# Patient Record
Sex: FEMALE | Race: WHITE | NOT HISPANIC OR LATINO | Employment: FULL TIME | URBAN - NONMETROPOLITAN AREA
[De-identification: names, ages, dates, MRNs, and addresses within clinical notes are randomized per-mention and may not be internally consistent; named-entity substitution may affect disease eponyms.]

---

## 2019-02-10 ENCOUNTER — HOSPITAL ENCOUNTER (EMERGENCY)
Facility: HOSPITAL | Age: 59
Discharge: HOME | End: 2019-02-10
Attending: NURSE PRACTITIONER | Admitting: NURSE PRACTITIONER
Payer: COMMERCIAL

## 2019-02-10 VITALS
OXYGEN SATURATION: 97 % | DIASTOLIC BLOOD PRESSURE: 81 MMHG | RESPIRATION RATE: 16 BRPM | SYSTOLIC BLOOD PRESSURE: 122 MMHG | TEMPERATURE: 97.8 F

## 2019-02-10 DIAGNOSIS — N76.0 VULVOVAGINITIS: ICD-10-CM

## 2019-02-10 LAB
ALBUMIN UR-MCNC: NEGATIVE MG/DL
APPEARANCE UR: CLEAR
BACTERIA #/AREA URNS HPF: ABNORMAL /HPF
BILIRUB UR QL STRIP: NEGATIVE
COLOR UR AUTO: ABNORMAL
GLUCOSE UR STRIP-MCNC: NEGATIVE MG/DL
HGB UR QL STRIP: NEGATIVE
KETONES UR STRIP-MCNC: NEGATIVE MG/DL
LEUKOCYTE ESTERASE UR QL STRIP: NEGATIVE
NITRATE UR QL: NEGATIVE
PH UR STRIP: 6.5 PH (ref 4.7–8)
RBC #/AREA URNS AUTO: 1 /HPF (ref 0–2)
SOURCE: ABNORMAL
SP GR UR STRIP: 1.02 (ref 1–1.03)
SPECIMEN SOURCE: NORMAL
UROBILINOGEN UR STRIP-MCNC: NORMAL MG/DL (ref 0–2)
WBC #/AREA URNS AUTO: 1 /HPF (ref 0–5)
WET PREP SPEC: NORMAL

## 2019-02-10 PROCEDURE — 87210 SMEAR WET MOUNT SALINE/INK: CPT | Performed by: NURSE PRACTITIONER

## 2019-02-10 PROCEDURE — 99213 OFFICE O/P EST LOW 20 MIN: CPT | Mod: Z6 | Performed by: NURSE PRACTITIONER

## 2019-02-10 PROCEDURE — G0463 HOSPITAL OUTPT CLINIC VISIT: HCPCS

## 2019-02-10 PROCEDURE — 81001 URINALYSIS AUTO W/SCOPE: CPT | Performed by: NURSE PRACTITIONER

## 2019-02-10 NOTE — ED PROVIDER NOTES
"  History     Chief Complaint   Patient presents with     Vaginitis     The history is provided by the patient. No  was used.     Rita Stovall is a 58 year old female who has had vaginal burning without discharge for about the past week. Denies dysuria. She is post-menopausal. She normally showers, but has been taking baths with Calgon in the bath. She has recently started using the tanning smith and is concerned she may have picked something up, even though she cleans the tanning bed. Some dyspareunia, describes it as \"a rubbing pain.\" She is monogamous.    Allergies:  No Known Allergies    Problem List:    There are no active problems to display for this patient.       Past Medical History:    No past medical history on file.    Past Surgical History:    Past Surgical History:   Procedure Laterality Date     COLONOSCOPY  7/26/2013    Procedure: COLONOSCOPY;  COLONOSCOPY WITH Polypectomy;  Surgeon: Sergei Andrade MD;  Location: [facility]     ENT SURGERY         Family History:    No family history on file.    Social History:  Marital Status:   [2]  Social History     Tobacco Use     Smoking status: Never Smoker   Substance Use Topics     Alcohol use: Yes     Comment: social     Drug use: Not on file        Medications:      VITAMIN D, CHOLECALCIFEROL, PO         Review of Systems   Constitutional: Negative for fever.   Respiratory: Negative for shortness of breath.    Cardiovascular: Negative for chest pain.   Gastrointestinal: Negative for abdominal pain.   Genitourinary: Positive for dyspareunia and vaginal pain. Negative for decreased urine volume, difficulty urinating, dysuria, flank pain, frequency, genital sores, pelvic pain, urgency, vaginal bleeding and vaginal discharge.   All other systems reviewed and are negative.      Physical Exam   BP: 122/81  Heart Rate: 64  Temp: 97.8  F (36.6  C)  Resp: 16  SpO2: 97 %      Physical Exam   Constitutional: She is oriented to person, " place, and time. She appears well-developed and well-nourished. No distress.   HENT:   Head: Normocephalic and atraumatic.   Eyes: No scleral icterus.   Neck: Normal range of motion. Neck supple.   Genitourinary: Pelvic exam was performed with patient supine. There is no rash, tenderness or lesion on the right labia. There is no rash, tenderness or lesion on the left labia.   Genitourinary Comments: Mild erythema perivaginally. No discharge.   Neurological: She is alert and oriented to person, place, and time.   Skin: Skin is warm and dry. No rash noted. She is not diaphoretic. No erythema. No pallor.       ED Course        Procedures    Results for orders placed or performed during the hospital encounter of 02/10/19 (from the past 24 hour(s))   Wet prep   Result Value Ref Range    Specimen Description Vagina     Wet Prep Moderate  WBC'S seen       Wet Prep No Trichomonas seen     Wet Prep No yeast seen     Wet Prep No clue cells seen    UA with Microscopic reflex to Culture   Result Value Ref Range    Color Urine Light Yellow     Appearance Urine Clear     Glucose Urine Negative NEG^Negative mg/dL    Bilirubin Urine Negative NEG^Negative    Ketones Urine Negative NEG^Negative mg/dL    Specific Gravity Urine 1.018 1.003 - 1.035    Blood Urine Negative NEG^Negative    pH Urine 6.5 4.7 - 8.0 pH    Protein Albumin Urine Negative NEG^Negative mg/dL    Urobilinogen mg/dL Normal 0.0 - 2.0 mg/dL    Nitrite Urine Negative NEG^Negative    Leukocyte Esterase Urine Negative NEG^Negative    Source Midstream Urine     WBC Urine 1 0 - 5 /HPF    RBC Urine 1 0 - 2 /HPF    Bacteria Urine None (A) NEG^Negative /HPF       Medications - No data to display    Assessments & Plan (with Medical Decision Making)     I have reviewed the nursing notes.    I have reviewed the findings, diagnosis, plan and need for follow up with the patient.   WBC's noted on wet prep, but other labs are normal. No drainage or odor. Burning sensation most  likely due to Calgon baths. Switch to plain-water baths. Follow up with Dr. Aguiar if symptoms are not improving within 1 week. Return to urgent care or ER if symptoms are worsening. Rita is agreeable to plan and discharged to home in stable condition.         Medication List      There are no discharge medications for this visit.         Final diagnoses:   Vulvovaginitis       2/10/2019   [facility]     Maria G Montanez APRN CNP  02/11/19 1006

## 2019-02-10 NOTE — ED AVS SNAPSHOT
[facility]  [address] [place] [id number]  Phone:  [phone]                                    Rita Stovall   MRN: [id number]    Department:  [facility]   Date of Visit:  2/10/2019           After Visit Summary Signature Page    I have received my discharge instructions, and my questions have been answered. I have discussed any challenges I see with this plan with the nurse or doctor.    ..........................................................................................................................................  Patient/Patient Representative Signature      ..........................................................................................................................................  Patient Representative Print Name and Relationship to Patient    ..................................................               ................................................  Date                                   Time    ..........................................................................................................................................  Reviewed by Signature/Title    ...................................................              ..............................................  Date                                               Time          22EPIC Rev 08/18

## 2019-02-10 NOTE — ED TRIAGE NOTES
Patient presents with 1 week or more of vaginal irritation.  States she thought it would get better but it has not.

## 2019-02-11 ASSESSMENT — ENCOUNTER SYMPTOMS
DYSURIA: 0
FEVER: 0
FREQUENCY: 0
DIFFICULTY URINATING: 0
ABDOMINAL PAIN: 0
FLANK PAIN: 0
SHORTNESS OF BREATH: 0

## 2020-07-08 ENCOUNTER — TRANSFERRED RECORDS (OUTPATIENT)
Dept: HEALTH INFORMATION MANAGEMENT | Facility: HOSPITAL | Age: 60
End: 2020-07-08

## 2020-07-08 ENCOUNTER — TRANSFERRED RECORDS (OUTPATIENT)
Dept: HEALTH INFORMATION MANAGEMENT | Facility: CLINIC | Age: 60
End: 2020-07-08

## 2020-07-08 LAB
CHOLEST SERPL-MCNC: 226 MG/DL (ref 114–200)
CREAT SERPL-MCNC: 0.85 MG/DL (ref 0.4–1)
GFR SERPL CREATININE-BSD FRML MDRD: >60 ML/MIN/1.73M2
GLUCOSE SERPL-MCNC: 97 MG/DL (ref 70–99)
HDLC SERPL-MCNC: 57 MG/DL (ref 40–60)
HPV ABSTRACT: NORMAL
LDLC SERPL CALC-MCNC: 152 MG/DL
PAP-ABSTRACT: NORMAL
POTASSIUM SERPL-SCNC: 3.7 MEQ/L (ref 3.4–5.1)
TRIGL SERPL-MCNC: 86 MG/DL (ref 10–200)
TSH SERPL-ACNC: 3.05 UIU/ML (ref 0.4–3.99)

## 2020-10-19 ENCOUNTER — OFFICE VISIT (OUTPATIENT)
Dept: OTOLARYNGOLOGY | Facility: OTHER | Age: 60
End: 2020-10-19
Attending: NURSE PRACTITIONER
Payer: COMMERCIAL

## 2020-10-19 VITALS
OXYGEN SATURATION: 98 % | SYSTOLIC BLOOD PRESSURE: 100 MMHG | HEIGHT: 63 IN | DIASTOLIC BLOOD PRESSURE: 70 MMHG | HEART RATE: 60 BPM | TEMPERATURE: 97 F | WEIGHT: 150 LBS | BODY MASS INDEX: 26.58 KG/M2

## 2020-10-19 DIAGNOSIS — R09.82 POST-NASAL DRAINAGE: Primary | ICD-10-CM

## 2020-10-19 PROCEDURE — 99213 OFFICE O/P EST LOW 20 MIN: CPT | Mod: 25 | Performed by: NURSE PRACTITIONER

## 2020-10-19 PROCEDURE — 31575 DIAGNOSTIC LARYNGOSCOPY: CPT | Performed by: NURSE PRACTITIONER

## 2020-10-19 RX ORDER — AZELASTINE 1 MG/ML
2 SPRAY, METERED NASAL 2 TIMES DAILY
Qty: 1 BOTTLE | Refills: 12 | Status: SHIPPED | OUTPATIENT
Start: 2020-10-19 | End: 2021-02-24

## 2020-10-19 RX ORDER — BUDESONIDE 0.5 MG/2ML
INHALANT ORAL
Qty: 1 BOX | Refills: 4 | Status: SHIPPED | OUTPATIENT
Start: 2020-10-19 | End: 2021-02-24

## 2020-10-19 ASSESSMENT — MIFFLIN-ST. JEOR: SCORE: 1229.53

## 2020-10-19 ASSESSMENT — PAIN SCALES - GENERAL: PAINLEVEL: NO PAIN (0)

## 2020-10-19 NOTE — PROGRESS NOTES
Otolaryngology Note         Chief Complaint:     Patient presents with:  Consult For: Recalcitrant post nasal drip           History of Present Illness:     She reports she started with PND a few months ago, she was seen by PCP and was started on Flonase without improvement, in fact she has noted worsening of symptoms.  She notes that her smell is blunted at times and her taste is also blunted at times.  She states the smell is worse when she feels more congested.      She reports constant feeling of globus sensation/drainage.  She coughs and clears sputum but this does not clear the globus sensation.  She has attempted to gargle with salt water, she is not using any other interventions.      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  She started he job about 3 months ago when symptoms were starting to worsen.      Appetite is decreased, she has been cutting carbs and attempting to lose weight.   No fatigue.  She does not endorse any shortness of breath or chest pain.   Symptoms have been present for 3-4 months and are worsening.      + history of sinus injury/trauma/surgery, Dr Pelayo from [place], about 15 + years, FESS due to recurrent sinusitis, has been doing well since surgery  No acid reflux   No dysphagia  No history of strep/ tonsillitis.   No history of COM, otological surgery  No history of rash  No history of seasonal allergies  She has a history of previous allergy testing with Dr Valderrama in [place] 30+ years ago, no allergies that she can remember.   No asthma  NO family history      Resides in house with no basement. There is no water or mold.  There is carpet in the bedroom.    Heat source in home: forced air  Pets: None         Medications:     Current Outpatient Rx   Medication Sig Dispense Refill     azelastine (ASTELIN) 0.1 % nasal spray Spray 2 sprays into both nostrils 2 times daily 1 Bottle 12     budesonide (PULMICORT) 0.5  "MG/2ML neb solution Mix 240 ml albino med sinus rinse, add 0.5 mg budesonide vial, rinse 1/2 bottle in each nostril twice daily 1 Box 4     VITAMIN D, CHOLECALCIFEROL, PO Take by mouth daily              Allergies:     Allergies: Patient has no known allergies.          Past Medical History:     No past medical history on file.         Past Surgical History:     Past Surgical History:   Procedure Laterality Date     COLONOSCOPY  7/26/2013    Procedure: COLONOSCOPY;  COLONOSCOPY WITH Polypectomy;  Surgeon: Sergei Andrade MD;  Location: [facility]     ENT SURGERY              Social History:     Social History     Tobacco Use     Smoking status: Never Smoker     Smokeless tobacco: Never Used   Substance Use Topics     Alcohol use: Yes     Comment: social     Drug use: None            Review of Systems:     ROS: See HPI         Physical Exam:     /70 (BP Location: Left arm, Patient Position: Sitting, Cuff Size: Adult Regular)   Pulse 60   Temp 97  F (36.1  C) (Tympanic)   Ht 1.6 m (5' 3\")   Wt 68 kg (150 lb)   SpO2 98%   BMI 26.57 kg/m      General - The patient is well nourished and well developed, and appears to have good nutritional status.  Alert and oriented to person and place, answers questions and cooperates with examination appropriately.   Head and Face - Normocephalic and atraumatic, with no gross asymmetry noted.  The facial nerve is intact, with strong symmetric movements.  Voice and Breathing - The patient was breathing comfortably without the use of accessory muscles. There was no wheezing, stridor, or stertor.  The patients voice was clear and strong, and had appropriate pitch and quality.  Ears -The external auditory canals are patent, the tympanic membranes are intact without effusion, retraction or mass.  Bony landmarks are intact.  Eyes - Extraocular movements intact, sclera were not icteric or injected, conjunctiva were pink and moist.  Mouth - Examination of the oral cavity showed pink, " healthy oral mucosa. No lesions or ulcerations noted.  The tongue was mobile and midline, and the dentition were in good condition.    Throat - The walls of the oropharynx were smooth, pink, moist, symmetric, and had no lesions or ulcerations.  The tonsillar pillars and soft palate were symmetric.  The uvula was midline on elevation.    Neck - Normal midline excursion of the laryngotracheal complex during swallowing.  Full range of motion on passive movement.  Palpation of the occipital, submental, submandibular, internal jugular chain, and supraclavicular nodes did not demonstrate any abnormal lymph nodes or masses.  Palpation of the thyroid was soft and smooth, with no nodules or goiter appreciated.  The trachea was mobile and midline.  Nose - External contour is symmetric, no gross deflection or scars.  Nasal mucosa is pink and moist with no abnormal mucus.  The septum was intact and the turbinates are enlarged.  No polyps, masses, or purulence noted on examination.    Attempts at mirror laryngoscopy were not possible due to gag reflex.  Therefore I proceeded with a fiberoptic examination after informed consent.  First I sprayed both sides of the nose with a mixture of lidocaine and neosynephrine.  I then passed the scope through the nasal cavity.     Findings:  Slight right septal deviation, no maeve polypoid change or purulence.     The nasopharynx was mucosally covered and symmetric, there was a large amount of clear drainage in the NP and base of the tongue.  The eustachian tube openings were unobstructed.  Going further down I had a clear view of the base of tongue which had normal appearing lingual tonsillar tissue.  The base of tongue was free of lesions, and the vallecula was open.  The epiglottis was smooth and mucosally covered.  The supraglottic larynx was then clearly visualized.  The vocal cords moved smoothly and symmetrically and were pearly white.  The pyriform sinuses were open and without maeve  mass or pooling of secretions upon valsalva, and the limited view of the postcricoid region did not show any lesions.  The patient tolerated the procedure well.         Assessment and Plan:       ICD-10-CM    1. Post-nasal drainage  R09.82 azelastine (ASTELIN) 0.1 % nasal spray     budesonide (PULMICORT) 0.5 MG/2ML neb solution       Budesonide rinses:  Budesonide nasal saline irrigation per instructions:  -Obtain Emir Med Sinus rinse over the counter.    -Use warm distilled water and 2 packets of the salt solution that comes with the bottle, dissolve in bottle up to the 240 mL gabriella.  -Add 1 vial of budesonide.  -Irrigate each side of your nose leaning over the sink, using 1/3 to 1/2 the volume of the bottle in each nostril every irrigation.  Irrigate 2 times daily.  -If additional rinses are needed/recommended, you may use the plan Emir Med Sinus irrigation without the use of added budesonide.     Start daily antihistamine - over the counter - Zyrtec (cetirizine), Claritin (loratadine), Allegra (fexofenadine)     Start astelin nasal spray 2 sprays twice daily    Follow up in 3-4 weeks for recheck        Nelly BAXTER  [facility] ENT

## 2020-10-19 NOTE — NURSING NOTE
"Chief Complaint   Patient presents with     Consult For     Recalcitrant post nasal drip       Initial /70 (BP Location: Left arm, Patient Position: Sitting, Cuff Size: Adult Regular)   Pulse 60   Temp 97  F (36.1  C) (Tympanic)   Ht 1.6 m (5' 3\")   Wt 68 kg (150 lb)   SpO2 98%   BMI 26.57 kg/m   Estimated body mass index is 26.57 kg/m  as calculated from the following:    Height as of this encounter: 1.6 m (5' 3\").    Weight as of this encounter: 68 kg (150 lb).  Medication Reconciliation: complete  Karey Mcgarry MA    "

## 2020-10-19 NOTE — PATIENT INSTRUCTIONS
Start budesonide nasal spray  Budesonide rinses:  Budesonide nasal saline irrigation per instructions:  -Obtain Emir Med Sinus rinse over the counter.    -Use warm distilled water and 2 packets of the salt solution that comes with the bottle, dissolve in bottle up to the 240 mL gabriella.  -Add 1 vial of budesonide.  -Irrigate each side of your nose leaning over the sink, using 1/3 to 1/2 the volume of the bottle in each nostril every irrigation.  Irrigate 2 times daily.  -If additional rinses are needed/recommended, you may use the plan Emir Med Sinus irrigation without the use of added budesonide.     Start daily antihistamine - over the counter - Zyrtec (cetirizine), Claritin (loratadine), Allegra (fexofenadine)     Start astelin nasal spray 2 sprays twice daily    Follow up in 3-4 weeks for recheck      Thank you for allowing Nelly BAXTER and our ENT team to participate in your care.  If your medications are too expensive, please call my nurse at the number listed below.  We can possibly change this medication.    If you have a scheduling or an appointment question please contact our Health Unit Coordinator at their direct line [phone].   ALL nursing questions or concerns can be directed to your ENT nurses at: [phone] or [phone].  ;

## 2020-10-19 NOTE — LETTER
10/19/2020         RE: Rita Stovall  [address] [address]  [place] [address]        Dear Colleague,    Thank you for referring your patient, Rita Stovall, to the [facility] [place]. Please see a copy of my visit note below.    Otolaryngology Note         Chief Complaint:     Patient presents with:  Consult For: Recalcitrant post nasal drip           History of Present Illness:     She reports she started with PND a few months ago, she was seen by PCP and was started on Flonase without improvement, in fact she has noted worsening of symptoms.  She notes that her smell is blunted at times and her taste is also blunted at times.  She states the smell is worse when she feels more congested.      She reports constant feeling of globus sensation/drainage.  She coughs and clears sputum but this does not clear the globus sensation.  She has attempted to gargle with salt water, she is not using any other interventions.      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  She started he job about 3 months ago when symptoms were starting to worsen.      Appetite is decreased, she has been cutting carbs and attempting to lose weight.   No fatigue.  She does not endorse any shortness of breath or chest pain.   Symptoms have been present for 3-4 months and are worsening.      + history of sinus injury/trauma/surgery, Dr Pelayo from [place], about 15 + years, FESS due to recurrent sinusitis, has been doing well since surgery  No acid reflux   No dysphagia  No history of strep/ tonsillitis.   No history of COM, otological surgery  No history of rash  No history of seasonal allergies  She has a history of previous allergy testing with Dr Valderrama in [place] 30+ years ago, no allergies that she can remember.   No asthma  NO family history      Resides in house with no basement. There is no water or mold.  There is carpet in the bedroom.    Heat source in  "home: forced air  Pets: None         Medications:     Current Outpatient Rx   Medication Sig Dispense Refill     azelastine (ASTELIN) 0.1 % nasal spray Spray 2 sprays into both nostrils 2 times daily 1 Bottle 12     budesonide (PULMICORT) 0.5 MG/2ML neb solution Mix 240 ml albino med sinus rinse, add 0.5 mg budesonide vial, rinse 1/2 bottle in each nostril twice daily 1 Box 4     VITAMIN D, CHOLECALCIFEROL, PO Take by mouth daily              Allergies:     Allergies: Patient has no known allergies.          Past Medical History:     No past medical history on file.         Past Surgical History:     Past Surgical History:   Procedure Laterality Date     COLONOSCOPY  7/26/2013    Procedure: COLONOSCOPY;  COLONOSCOPY WITH Polypectomy;  Surgeon: Sergei Andrade MD;  Location: [facility]     ENT SURGERY              Social History:     Social History     Tobacco Use     Smoking status: Never Smoker     Smokeless tobacco: Never Used   Substance Use Topics     Alcohol use: Yes     Comment: social     Drug use: None            Review of Systems:     ROS: See HPI         Physical Exam:     /70 (BP Location: Left arm, Patient Position: Sitting, Cuff Size: Adult Regular)   Pulse 60   Temp 97  F (36.1  C) (Tympanic)   Ht 1.6 m (5' 3\")   Wt 68 kg (150 lb)   SpO2 98%   BMI 26.57 kg/m      General - The patient is well nourished and well developed, and appears to have good nutritional status.  Alert and oriented to person and place, answers questions and cooperates with examination appropriately.   Head and Face - Normocephalic and atraumatic, with no gross asymmetry noted.  The facial nerve is intact, with strong symmetric movements.  Voice and Breathing - The patient was breathing comfortably without the use of accessory muscles. There was no wheezing, stridor, or stertor.  The patients voice was clear and strong, and had appropriate pitch and quality.  Ears -The external auditory canals are patent, the tympanic " membranes are intact without effusion, retraction or mass.  Bony landmarks are intact.  Eyes - Extraocular movements intact, sclera were not icteric or injected, conjunctiva were pink and moist.  Mouth - Examination of the oral cavity showed pink, healthy oral mucosa. No lesions or ulcerations noted.  The tongue was mobile and midline, and the dentition were in good condition.    Throat - The walls of the oropharynx were smooth, pink, moist, symmetric, and had no lesions or ulcerations.  The tonsillar pillars and soft palate were symmetric.  The uvula was midline on elevation.    Neck - Normal midline excursion of the laryngotracheal complex during swallowing.  Full range of motion on passive movement.  Palpation of the occipital, submental, submandibular, internal jugular chain, and supraclavicular nodes did not demonstrate any abnormal lymph nodes or masses.  Palpation of the thyroid was soft and smooth, with no nodules or goiter appreciated.  The trachea was mobile and midline.  Nose - External contour is symmetric, no gross deflection or scars.  Nasal mucosa is pink and moist with no abnormal mucus.  The septum was intact and the turbinates are enlarged.  No polyps, masses, or purulence noted on examination.    Attempts at mirror laryngoscopy were not possible due to gag reflex.  Therefore I proceeded with a fiberoptic examination after informed consent.  First I sprayed both sides of the nose with a mixture of lidocaine and neosynephrine.  I then passed the scope through the nasal cavity.     Findings:  Slight right septal deviation, no maeve polypoid change or purulence.     The nasopharynx was mucosally covered and symmetric, there was a large amount of clear drainage in the NP and base of the tongue.  The eustachian tube openings were unobstructed.  Going further down I had a clear view of the base of tongue which had normal appearing lingual tonsillar tissue.  The base of tongue was free of lesions, and the  vallecula was open.  The epiglottis was smooth and mucosally covered.  The supraglottic larynx was then clearly visualized.  The vocal cords moved smoothly and symmetrically and were pearly white.  The pyriform sinuses were open and without maeve mass or pooling of secretions upon valsalva, and the limited view of the postcricoid region did not show any lesions.  The patient tolerated the procedure well.         Assessment and Plan:       ICD-10-CM    1. Post-nasal drainage  R09.82 azelastine (ASTELIN) 0.1 % nasal spray     budesonide (PULMICORT) 0.5 MG/2ML neb solution       Budesonide rinses:  Budesonide nasal saline irrigation per instructions:  -Obtain Emir Med Sinus rinse over the counter.    -Use warm distilled water and 2 packets of the salt solution that comes with the bottle, dissolve in bottle up to the 240 mL gabriella.  -Add 1 vial of budesonide.  -Irrigate each side of your nose leaning over the sink, using 1/3 to 1/2 the volume of the bottle in each nostril every irrigation.  Irrigate 2 times daily.  -If additional rinses are needed/recommended, you may use the plan Emir Med Sinus irrigation without the use of added budesonide.     Start daily antihistamine - over the counter - Zyrtec (cetirizine), Claritin (loratadine), Allegra (fexofenadine)     Start astelin nasal spray 2 sprays twice daily    Follow up in 3-4 weeks for recheck        Nelly BAXTER  [facility] ENT        Scope # [id number]      Again, thank you for allowing me to participate in the care of your patient.        Sincerely,        Nelly Saavedra NP

## 2020-12-06 ENCOUNTER — HOSPITAL ENCOUNTER (EMERGENCY)
Facility: HOSPITAL | Age: 60
Discharge: HOME | End: 2020-12-06
Attending: PHYSICIAN ASSISTANT | Admitting: PHYSICIAN ASSISTANT
Payer: COMMERCIAL

## 2020-12-06 VITALS
HEART RATE: 76 BPM | OXYGEN SATURATION: 99 % | SYSTOLIC BLOOD PRESSURE: 130 MMHG | DIASTOLIC BLOOD PRESSURE: 85 MMHG | RESPIRATION RATE: 16 BRPM | TEMPERATURE: 97.3 F

## 2020-12-06 DIAGNOSIS — B96.89 BACTERIAL VAGINOSIS: ICD-10-CM

## 2020-12-06 DIAGNOSIS — N76.0 BACTERIAL VAGINOSIS: ICD-10-CM

## 2020-12-06 LAB
ALBUMIN UR-MCNC: NEGATIVE MG/DL
APPEARANCE UR: ABNORMAL
BACTERIA #/AREA URNS HPF: ABNORMAL /HPF
BILIRUB UR QL STRIP: NEGATIVE
COLOR UR AUTO: ABNORMAL
GLUCOSE UR STRIP-MCNC: NEGATIVE MG/DL
HGB UR QL STRIP: NEGATIVE
KETONES UR STRIP-MCNC: NEGATIVE MG/DL
LEUKOCYTE ESTERASE UR QL STRIP: NEGATIVE
MUCOUS THREADS #/AREA URNS LPF: PRESENT /LPF
NITRATE UR QL: NEGATIVE
PH UR STRIP: 7 PH (ref 4.7–8)
RBC #/AREA URNS AUTO: 6 /HPF (ref 0–2)
SOURCE: ABNORMAL
SP GR UR STRIP: 1.02 (ref 1–1.03)
SPECIMEN SOURCE: ABNORMAL
SQUAMOUS #/AREA URNS AUTO: 3 /HPF (ref 0–1)
UROBILINOGEN UR STRIP-MCNC: NORMAL MG/DL (ref 0–2)
WBC #/AREA URNS AUTO: 4 /HPF (ref 0–5)
WET PREP SPEC: ABNORMAL

## 2020-12-06 PROCEDURE — 99213 OFFICE O/P EST LOW 20 MIN: CPT | Performed by: PHYSICIAN ASSISTANT

## 2020-12-06 PROCEDURE — 81001 URINALYSIS AUTO W/SCOPE: CPT | Performed by: PHYSICIAN ASSISTANT

## 2020-12-06 PROCEDURE — 87210 SMEAR WET MOUNT SALINE/INK: CPT | Performed by: PHYSICIAN ASSISTANT

## 2020-12-06 PROCEDURE — G0463 HOSPITAL OUTPT CLINIC VISIT: HCPCS

## 2020-12-06 RX ORDER — METRONIDAZOLE 500 MG/1
500 TABLET ORAL 2 TIMES DAILY
Qty: 14 TABLET | Refills: 0 | Status: SHIPPED | OUTPATIENT
Start: 2020-12-06 | End: 2020-12-13

## 2020-12-06 ASSESSMENT — ENCOUNTER SYMPTOMS
NAUSEA: 0
SHORTNESS OF BREATH: 0
FREQUENCY: 0
ARTHRALGIAS: 0
DIZZINESS: 0
PHOTOPHOBIA: 0
CHILLS: 0
COUGH: 0
HEADACHES: 0
POLYPHAGIA: 0
LIGHT-HEADEDNESS: 0
MYALGIAS: 0
DYSURIA: 0
ABDOMINAL PAIN: 0
FLANK PAIN: 0
FATIGUE: 0
PALPITATIONS: 0
DIARRHEA: 0
VOMITING: 0
POLYDIPSIA: 0
FEVER: 0
HEMATURIA: 0

## 2020-12-06 NOTE — ED PROVIDER NOTES
History     Chief Complaint   Patient presents with     Vaginitis     HPI  Rita Stovall is a 60 year old female who presents to urgent care with chief complaint of vaginitis. Noticed a fishy odor Tuesday and burning sensation began this weekend.     Present of discharge: none  Urinary symptoms: denies dysuria, denies changes to frequency or urgency   Changes to bowel habits: none  Postmenopausal: yes  Hygiene (bath/shower): mainly shower  Dyspareunia: yes, warming lube tried recently, normal sensitivie  Fevers, chills, other signs of infection: none  Denies chest pain, shortness of breath and/or abdominal pain.    Additional symptoms to report: none    Allergies, past medical, surgical and medication list were reviewed.    PCP - Nelly Moya MD    Allergies:  No Known Allergies    Problem List:    There are no active problems to display for this patient.     Past Medical History:    No past medical history on file.    Past Surgical History:    Past Surgical History:   Procedure Laterality Date     COLONOSCOPY  7/26/2013    Procedure: COLONOSCOPY;  COLONOSCOPY WITH Polypectomy;  Surgeon: Sergei Andrade MD;  Location: [facility]     ENT SURGERY       Family History:    No family history on file.    Social History:  Marital Status:   [2]  Social History     Tobacco Use     Smoking status: Never Smoker     Smokeless tobacco: Never Used   Substance Use Topics     Alcohol use: Yes     Comment: social     Drug use: Not on file      Medications:         metroNIDAZOLE (FLAGYL) 500 MG tablet       azelastine (ASTELIN) 0.1 % nasal spray       budesonide (PULMICORT) 0.5 MG/2ML neb solution       VITAMIN D, CHOLECALCIFEROL, PO      Review of Systems   Constitutional: Negative for chills, fatigue and fever.   Eyes: Negative for photophobia.   Respiratory: Negative for cough and shortness of breath.    Cardiovascular: Negative for chest pain and palpitations.   Gastrointestinal: Negative for abdominal pain,  diarrhea, nausea and vomiting.   Endocrine: Negative for polydipsia, polyphagia and polyuria.   Genitourinary: Positive for dyspareunia and vaginal pain. Negative for dysuria, flank pain, frequency, hematuria, pelvic pain, urgency, vaginal bleeding and vaginal discharge.   Musculoskeletal: Negative for arthralgias and myalgias.   Skin: Negative for rash.   Neurological: Negative for dizziness, light-headedness and headaches.   All other systems reviewed and are negative.    Physical Exam   BP: 130/85  Pulse: 76  Temp: 97.3  F (36.3  C)  Resp: 16  SpO2: 99 %    Physical Exam  Vitals signs and nursing note reviewed.   HENT:      Head: Normocephalic and atraumatic.   Eyes:      Conjunctiva/sclera: Conjunctivae normal.   Neck:      Musculoskeletal: Normal range of motion.   Cardiovascular:      Rate and Rhythm: Normal rate and regular rhythm.      Heart sounds: Normal heart sounds.   Pulmonary:      Effort: Pulmonary effort is normal.      Breath sounds: Normal breath sounds.   Abdominal:      General: Abdomen is flat. Bowel sounds are normal. There is no distension.      Palpations: Abdomen is soft.      Tenderness: There is no abdominal tenderness. There is no guarding.   Musculoskeletal: Normal range of motion.   Skin:     General: Skin is warm and dry.      Capillary Refill: Capillary refill takes less than 2 seconds.   Neurological:      General: No focal deficit present.      Mental Status: She is alert and oriented to person, place, and time.   Psychiatric:         Mood and Affect: Mood normal.         Behavior: Behavior normal.         Thought Content: Thought content normal.         Judgment: Judgment normal.       Patient deferred  exam    ED Course        Procedures                 Results for orders placed or performed during the hospital encounter of 12/06/20 (from the past 24 hour(s))   UA with Microscopic reflex to Culture    Specimen: Midstream Urine   Result Value Ref Range    Color Urine Light Yellow      Appearance Urine Slightly Cloudy     Glucose Urine Negative NEG^Negative mg/dL    Bilirubin Urine Negative NEG^Negative    Ketones Urine Negative NEG^Negative mg/dL    Specific Gravity Urine 1.020 1.003 - 1.035    Blood Urine Negative NEG^Negative    pH Urine 7.0 4.7 - 8.0 pH    Protein Albumin Urine Negative NEG^Negative mg/dL    Urobilinogen mg/dL Normal 0.0 - 2.0 mg/dL    Nitrite Urine Negative NEG^Negative    Leukocyte Esterase Urine Negative NEG^Negative    Source Midstream Urine     WBC Urine 4 0 - 5 /HPF    RBC Urine 6 (H) 0 - 2 /HPF    Bacteria Urine None (A) NEG^Negative /HPF    Squamous Epithelial /HPF Urine 3 (H) 0 - 1 /HPF    Mucous Urine Present (A) NEG^Negative /LPF   Wet prep    Specimen: Vagina   Result Value Ref Range    Specimen Description Vagina     Wet Prep Moderate  WBC'S seen       Wet Prep No Trichomonas seen     Wet Prep Clue cells seen (A)     Wet Prep No yeast seen        Medications - No data to display    Assessments & Plan (with Medical Decision Making)     I have reviewed the nursing notes.    I have reviewed the findings, diagnosis, plan and need for follow up with the patient.    New Prescriptions    METRONIDAZOLE (FLAGYL) 500 MG TABLET    Take 1 tablet (500 mg) by mouth 2 times daily for 7 days       Final diagnoses:   Bacterial vaginosis   Patient is a pleasant, alert 60-year-old female in no obvious distress, she presents to urgent care today with a chief complaint of vaginitis which began on Tuesday, 12/1/2020 and continues to progress through today.  Vital signs stable.  HPI, review of systems and physical exam are available for review above.  Vaginal specimen was obtained and clue cells seen on this.  Urine negative for infection.  Flagyl 500 mg p.o. twice daily for 7 days was sent through to pharmacy of patient's choice.  She is to avoid sexual intercourse until her symptoms resolved.  I recommend that she change her lubrication to an unscented/generic (no warming for  additional properties), she expressed understanding of this.  She should continue to watch for fevers, chills, local or systemic signs of infection.  If her symptoms or not resolving in the next 2 to 3 days, she should follow-up with her PCP for reevaluation.    All questions and concerns were answered to patient satisfaction, she is in agreement understanding of above treatment plan.  She will follow-up with urgent care in as-needed basis.    Reema Cloud PA-C  12/6/2020   [facility]    This document was prepared using a combination of typing and voice generated software.  While every attempt was made for accuracy, spelling and grammatical errors may exist.

## 2020-12-06 NOTE — DISCHARGE INSTRUCTIONS
Thank you for allowing the urgent care to participate in your care. Please refer to your AVS for follow up and pain/symptoms management recommendations (I.e.: medications, helpful conservative treatment modalities, appropriate follow up if need to a specialist or family practice, etc.). Please return to urgent care if your symptoms change or worsen.     Discharge instructions:  -Follow up with PCP in 3-5 days  -If you were prescribed a medication(s), please take this as prescribed/directed  -Monitor your symptoms, if changing/worsening, return to UC/ER or PCP for follow up    Monitor for fevers, chills, worsening symptoms    Flagyl/metronidazole 500 mg p.o. twice daily for 7 days was sent through to pharmacy of your choice    Avoid intercourse until symptoms resolve    Change lubrication to a nonscented, generic lubrication without forming properties

## 2020-12-06 NOTE — ED TRIAGE NOTES
Pt stats she has a vaginal infection. Said last time she was here its the  same symptoms. Looking for an antibiotic.

## 2020-12-06 NOTE — ED AVS SNAPSHOT
[facility]  [address] [address]  Phone: [phone]                                    Rita Stovall   MRN: [id number]    Department: [facility]   Date of Visit: 12/6/2020           After Visit Summary Signature Page    I have received my discharge instructions, and my questions have been answered. I have discussed any challenges I see with this plan with the nurse or doctor.    ..........................................................................................................................................  Patient/Patient Representative Signature      ..........................................................................................................................................  Patient Representative Print Name and Relationship to Patient    ..................................................               ................................................  Date                                   Time    ..........................................................................................................................................  Reviewed by Signature/Title    ...................................................              ..............................................  Date                                               Time          22EPIC Rev 08/18

## 2021-02-22 ENCOUNTER — TELEPHONE (OUTPATIENT)
Dept: OTOLARYNGOLOGY | Facility: OTHER | Age: 61
End: 2021-02-22

## 2021-02-22 NOTE — TELEPHONE ENCOUNTER
Rita would like the MRI done now.  I did not see the referral for an MRI.  Rita then thought she could see JS after that MRI is done.  Please advise! Thank You!

## 2021-02-22 NOTE — TELEPHONE ENCOUNTER
Can you call and clarify what she is looking for?  I don't have any documentation about an MRI.  I saw her in October 2020, she has not had follow up.  Thanks JS

## 2021-02-24 ENCOUNTER — OFFICE VISIT (OUTPATIENT)
Dept: OTOLARYNGOLOGY | Facility: OTHER | Age: 61
End: 2021-02-24
Attending: NURSE PRACTITIONER
Payer: COMMERCIAL

## 2021-02-24 VITALS
DIASTOLIC BLOOD PRESSURE: 78 MMHG | HEART RATE: 75 BPM | OXYGEN SATURATION: 100 % | BODY MASS INDEX: 26.58 KG/M2 | TEMPERATURE: 97.8 F | HEIGHT: 63 IN | WEIGHT: 150 LBS | SYSTOLIC BLOOD PRESSURE: 124 MMHG

## 2021-02-24 DIAGNOSIS — R09.82 POST-NASAL DRAINAGE: ICD-10-CM

## 2021-02-24 DIAGNOSIS — J33.9 NASAL POLYP: ICD-10-CM

## 2021-02-24 DIAGNOSIS — J32.4 CHRONIC PANSINUSITIS: ICD-10-CM

## 2021-02-24 DIAGNOSIS — R43.8 HYPOSMIA: Primary | ICD-10-CM

## 2021-02-24 PROCEDURE — 31231 NASAL ENDOSCOPY DX: CPT | Performed by: NURSE PRACTITIONER

## 2021-02-24 PROCEDURE — 99213 OFFICE O/P EST LOW 20 MIN: CPT | Mod: 25 | Performed by: NURSE PRACTITIONER

## 2021-02-24 RX ORDER — FLUTICASONE PROPIONATE 50 MCG
SPRAY, SUSPENSION (ML) NASAL
Qty: 16 G | Refills: 3 | Status: SHIPPED | OUTPATIENT
Start: 2021-02-24 | End: 2021-07-07

## 2021-02-24 RX ORDER — BUDESONIDE 0.5 MG/2ML
INHALANT ORAL
Qty: 30 AMPULE | Refills: 3 | Status: SHIPPED | OUTPATIENT
Start: 2021-02-24 | End: 2021-06-21

## 2021-02-24 ASSESSMENT — PAIN SCALES - GENERAL: PAINLEVEL: NO PAIN (0)

## 2021-02-24 ASSESSMENT — MIFFLIN-ST. JEOR: SCORE: 1229.53

## 2021-02-24 NOTE — LETTER
2/24/2021         RE: Rita Stovall  [address] [place] [address]        Dear Colleague,    Thank you for referring your patient, Rita Stovall, to the [facility]. Please see a copy of my visit note below.    Otolaryngology Note         Chief Complaint:     Patient presents with:  Nose Problem: Recalcitrant Nasal Drip f/u           History of Present Illness:     Rita Stovall is a 60 year old female seen today for follow up PND.  I last saw Rita in October 2020.  At that time she was experiencing PND and globus sensation.  Flex scope showed a large amount of drainage in the NP, no other abnormality noted.  She was started on Astelin nasal spray and budesonide rinses.      Today she reports the drainage has cleared up.  She reports continued blunted smell and taste.  She is not currently using the astelin nasal spray or budesonide.  She used them for about a month.  She is not having significant facial pain or pressure.  She does report a distant history of nasal trauma where she feels like she broke her nose, she was not seen after the break.   She feels that her decreased smell and taste has decreased gradually over time.  She reports she was drinking sour milk at oone time and didn't realize it until her  told her.     She denies history of seasonal allergies.      She does have a distant history of sinus surgery ~ 15 years ago.  Had blunted smell and taste since then, but has noted a gradual worsening over time.  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      She denies ASA or NSAID allergy, no Asthma         Medications:     Current Outpatient Rx   Medication Sig Dispense Refill     budesonide (PULMICORT) 0.5 MG/2ML neb solution Mix 240 ml albino med sinus rinse, add 0.5 mg budesonide vial, rinse 1/2 bottle in each nostril twice daily 30 ampule 3     fluticasone (FLONASE) 50 MCG/ACT nasal spray 2 sprays to each nostril twice daily x 1 week, then decrease 2  "sprays once daily thereafter 16 g 3     VITAMIN D, CHOLECALCIFEROL, PO Take by mouth daily              Allergies:     Allergies: Patient has no known allergies.          Past Medical History:     History reviewed. No pertinent past medical history.         Past Surgical History:     Past Surgical History:   Procedure Laterality Date     COLONOSCOPY  7/26/2013    Procedure: COLONOSCOPY;  COLONOSCOPY WITH Polypectomy;  Surgeon: Sergei Andrade MD;  Location: [facility]     ENT SURGERY         ENT family history reviewed         Social History:     Social History     Tobacco Use     Smoking status: Never Smoker     Smokeless tobacco: Never Used   Substance Use Topics     Alcohol use: Yes     Comment: social     Drug use: None            Review of Systems:     ROS: See HPI         Physical Exam:     /78 (Cuff Size: Adult Regular)   Pulse 75   Temp 97.8  F (36.6  C) (Tympanic)   Ht 1.6 m (5' 3\")   Wt 68 kg (150 lb)   SpO2 100%   BMI 26.57 kg/m      General - The patient is well nourished and well developed, and appears to have good nutritional status.  Alert and oriented to person and place, answers questions and cooperates with examination appropriately.   Head and Face - Normocephalic and atraumatic, with no gross asymmetry noted.  The facial nerve is intact, with strong symmetric movements.  Voice and Breathing - The patient was breathing comfortably without the use of accessory muscles. There was no wheezing, stridor. The patients voice was clear and strong, and had appropriate pitch and quality.  Ears - External ear normal. Canals are patent. Right tympanic membrane is intact without effusion, retraction or mass. Left tympanic membrane is intact without effusion, retraction or mass.  Eyes - Extraocular movements intact, sclera were not icteric or injected.  Mouth - Examination of the oral cavity showed pink, healthy oral mucosa. Dentition in good condition. No lesions or ulcerations noted. The tongue " was mobile and midline.   Throat - The walls of the oropharynx were smooth, pink, moist, symmetric, and had no lesions or ulcerations.  The tonsillar pillars and soft palate were symmetric. The uvula was midline on elevation.    Neck - Normal midline excursion of the laryngotracheal complex during swallowing.  Full range of motion on passive movement.  Palpation of the occipital, submental, submandibular, internal jugular chain, and supraclavicular nodes did not demonstrate any abnormal lymph nodes or masses.  Palpation of the thyroid was soft and smooth, with no nodules or goiter appreciated.  The trachea was mobile and midline.  Nose - External contour is symmetric, no gross deflection or scars.  Nasal mucosa is pink and moist with no abnormal mucus.  The septum and turbinates were evaluated with nasal speculum, right DNS.     To evaluate the nose and sinuses, I performed rigid nasal endoscopy.  I sprayed both nares with 2 sprays lidocaine and neosynephrine.     I began with the RIGHT side using a 0 degree rigid nasal endoscope, and then similarly examined the LEFT side     Findings: right DNS  Inferior turbinates:  enlarged  Middle turbinate and middle meatus:  polypoid change in bilateral MM  Clear drainage throughout, no purulence noted  No purulence noted in sphenoethmoidal recess  Nasopharynx with clear drainage, ET patent, no edema  The patient tolerated the procedure well            Assessment and Plan:       ICD-10-CM    1. Hyposmia  R43.8    2. Post-nasal drainage  R09.82 CT Sinus w/o Contrast     fluticasone (FLONASE) 50 MCG/ACT nasal spray     budesonide (PULMICORT) 0.5 MG/2ML neb solution   3. Nasal polyp  J33.9 CT Sinus w/o Contrast     fluticasone (FLONASE) 50 MCG/ACT nasal spray   4. Chronic pansinusitis  J32.4 CT Sinus w/o Contrast     fluticasone (FLONASE) 50 MCG/ACT nasal spray     Start flonase 2 sprays twice daily x 1 week, then 2 sprays daily thereafter  Complete CT scan sinus  Follow up with  Dr Vaughn for surgical consult after CT scan is completed  Start budesonide rinses - 2 times daily  We discussed starting oral prednisone, she is concerned about side effects and defers at this time.      Recommend allergy testing in the future  Indications for allergy testing include:    1) Confirm suspicion of allergic rhinitis due to inhalant allergies  2) Identify the offending allergen to determine specific mode of treatment  3) In the case of chronic rhinosinusitis: when symptoms are not controlled by avoidance and pharmacotherapy  4) In the Asthma patient when exacerbations may be due to perennial allergen exposure  5) Suspect food allergy  6) Otitis Media, chronic rhinitis, atopic dermatitis, Meniere disease, headache, pharyngitis or eye symptoms      Modified quantitative testing (MQT) will be performed.  Signed consent was obtained, and the risks of immunotherapy were discussed, including the potential for anaphylaxis.     If immunotherapy (IT) is recommended, there is continued risk of anaphylaxis.   Anaphylaxis can cause death. The patient will need to be monitored for 30 minutes post injection.  They must present their epinephrine pen prior to injection.  Subcutaneous as well as sublingual immunotherapy (SLIT) were discussed as potential treatment options.  The patient was told SLIT is not approved by the FDA and is cash pay.  The general time frame of immunotherapy was discussed (generally 3-5 years, sometimes longer), and the basic immunology behind IT was discussed.    Nelly BAXTER  [facility]  8:33 AM  February 24, 2021        Again, thank you for allowing me to participate in the care of your patient.        Sincerely,        Nelly Saavedra NP

## 2021-02-24 NOTE — NURSING NOTE
"Chief Complaint   Patient presents with     Nose Problem     Recalcitrant Nasal Drip f/u       Initial /78 (Cuff Size: Adult Regular)   Pulse 75   Temp 97.8  F (36.6  C) (Tympanic)   Ht 1.6 m (5' 3\")   Wt 68 kg (150 lb)   SpO2 100%   BMI 26.57 kg/m   Estimated body mass index is 26.57 kg/m  as calculated from the following:    Height as of this encounter: 1.6 m (5' 3\").    Weight as of this encounter: 68 kg (150 lb).  Medication Reconciliation: complete  Ely Brody LPN    "

## 2021-02-24 NOTE — PROGRESS NOTES
Otolaryngology Note         Chief Complaint:     Patient presents with:  Nose Problem: Recalcitrant Nasal Drip f/u           History of Present Illness:     Rita Stovall is a 60 year old female seen today for follow up PND.  I last saw Rita in October 2020.  At that time she was experiencing PND and globus sensation.  Flex scope showed a large amount of drainage in the NP, no other abnormality noted.  She was started on Astelin nasal spray and budesonide rinses.      Today she reports the drainage has cleared up.  She reports continued blunted smell and taste.  She is not currently using the astelin nasal spray or budesonide.  She used them for about a month.  She is not having significant facial pain or pressure.  She does report a distant history of nasal trauma where she feels like she broke her nose, she was not seen after the break.   She feels that her decreased smell and taste has decreased gradually over time.  She reports she was drinking sour milk at oone time and didn't realize it until her  told her.     She denies history of seasonal allergies.      She does have a distant history of sinus surgery ~ 15 years ago.  Had blunted smell and taste since then, but has noted a gradual worsening over time.  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      She denies ASA or NSAID allergy, no Asthma         Medications:     Current Outpatient Rx   Medication Sig Dispense Refill     budesonide (PULMICORT) 0.5 MG/2ML neb solution Mix 240 ml albino med sinus rinse, add 0.5 mg budesonide vial, rinse 1/2 bottle in each nostril twice daily 30 ampule 3     fluticasone (FLONASE) 50 MCG/ACT nasal spray 2 sprays to each nostril twice daily x 1 week, then decrease 2 sprays once daily thereafter 16 g 3     VITAMIN D, CHOLECALCIFEROL, PO Take by mouth daily              Allergies:     Allergies: Patient has no known allergies.          Past Medical History:     History reviewed. No pertinent past medical  "history.         Past Surgical History:     Past Surgical History:   Procedure Laterality Date     COLONOSCOPY  7/26/2013    Procedure: COLONOSCOPY;  COLONOSCOPY WITH Polypectomy;  Surgeon: Sergei Andrade MD;  Location: [facility]     ENT SURGERY         ENT family history reviewed         Social History:     Social History     Tobacco Use     Smoking status: Never Smoker     Smokeless tobacco: Never Used   Substance Use Topics     Alcohol use: Yes     Comment: social     Drug use: None            Review of Systems:     ROS: See HPI         Physical Exam:     /78 (Cuff Size: Adult Regular)   Pulse 75   Temp 97.8  F (36.6  C) (Tympanic)   Ht 1.6 m (5' 3\")   Wt 68 kg (150 lb)   SpO2 100%   BMI 26.57 kg/m      General - The patient is well nourished and well developed, and appears to have good nutritional status.  Alert and oriented to person and place, answers questions and cooperates with examination appropriately.   Head and Face - Normocephalic and atraumatic, with no gross asymmetry noted.  The facial nerve is intact, with strong symmetric movements.  Voice and Breathing - The patient was breathing comfortably without the use of accessory muscles. There was no wheezing, stridor. The patients voice was clear and strong, and had appropriate pitch and quality.  Ears - External ear normal. Canals are patent. Right tympanic membrane is intact without effusion, retraction or mass. Left tympanic membrane is intact without effusion, retraction or mass.  Eyes - Extraocular movements intact, sclera were not icteric or injected.  Mouth - Examination of the oral cavity showed pink, healthy oral mucosa. Dentition in good condition. No lesions or ulcerations noted. The tongue was mobile and midline.   Throat - The walls of the oropharynx were smooth, pink, moist, symmetric, and had no lesions or ulcerations.  The tonsillar pillars and soft palate were symmetric. The uvula was midline on elevation.    Neck - Normal " midline excursion of the laryngotracheal complex during swallowing.  Full range of motion on passive movement.  Palpation of the occipital, submental, submandibular, internal jugular chain, and supraclavicular nodes did not demonstrate any abnormal lymph nodes or masses.  Palpation of the thyroid was soft and smooth, with no nodules or goiter appreciated.  The trachea was mobile and midline.  Nose - External contour is symmetric, no gross deflection or scars.  Nasal mucosa is pink and moist with no abnormal mucus.  The septum and turbinates were evaluated with nasal speculum, right DNS.     To evaluate the nose and sinuses, I performed rigid nasal endoscopy.  I sprayed both nares with 2 sprays lidocaine and neosynephrine.     I began with the RIGHT side using a 0 degree rigid nasal endoscope, and then similarly examined the LEFT side     Findings: right DNS  Inferior turbinates:  enlarged  Middle turbinate and middle meatus:  polypoid change in bilateral MM  Clear drainage throughout, no purulence noted  No purulence noted in sphenoethmoidal recess  Nasopharynx with clear drainage, ET patent, no edema  The patient tolerated the procedure well            Assessment and Plan:       ICD-10-CM    1. Hyposmia  R43.8    2. Post-nasal drainage  R09.82 CT Sinus w/o Contrast     fluticasone (FLONASE) 50 MCG/ACT nasal spray     budesonide (PULMICORT) 0.5 MG/2ML neb solution   3. Nasal polyp  J33.9 CT Sinus w/o Contrast     fluticasone (FLONASE) 50 MCG/ACT nasal spray   4. Chronic pansinusitis  J32.4 CT Sinus w/o Contrast     fluticasone (FLONASE) 50 MCG/ACT nasal spray     Start flonase 2 sprays twice daily x 1 week, then 2 sprays daily thereafter  Complete CT scan sinus  Follow up with Dr Vaughn for surgical consult after CT scan is completed  Start budesonide rinses - 2 times daily  We discussed starting oral prednisone, she is concerned about side effects and defers at this time.      Recommend allergy testing in the  future  Indications for allergy testing include:    1) Confirm suspicion of allergic rhinitis due to inhalant allergies  2) Identify the offending allergen to determine specific mode of treatment  3) In the case of chronic rhinosinusitis: when symptoms are not controlled by avoidance and pharmacotherapy  4) In the Asthma patient when exacerbations may be due to perennial allergen exposure  5) Suspect food allergy  6) Otitis Media, chronic rhinitis, atopic dermatitis, Meniere disease, headache, pharyngitis or eye symptoms      Modified quantitative testing (MQT) will be performed.  Signed consent was obtained, and the risks of immunotherapy were discussed, including the potential for anaphylaxis.     If immunotherapy (IT) is recommended, there is continued risk of anaphylaxis.   Anaphylaxis can cause death. The patient will need to be monitored for 30 minutes post injection.  They must present their epinephrine pen prior to injection.  Subcutaneous as well as sublingual immunotherapy (SLIT) were discussed as potential treatment options.  The patient was told SLIT is not approved by the FDA and is cash pay.  The general time frame of immunotherapy was discussed (generally 3-5 years, sometimes longer), and the basic immunology behind IT was discussed.    Nelly BAXTER  [facility] ENT  8:33 AM  February 24, 2021        Scope # [id number]

## 2021-02-24 NOTE — PATIENT INSTRUCTIONS
Thank you for allowing Nelly Saavedra NP and our ENT team to participate in your care.  If your medications are too expensive, please give the nurse a call.  We can possibly change this medication.  If you have a scheduling or an appointment question please contact our Health Unit Coordinator at their direct line [phone] ext: [phone].   ALL nursing questions or concerns can be directed to your ENT Nurse--Yudi: [phone]    Start flonase 2 sprays twice daily x 1 week, then 2 sprays daily thereafter  Complete CT scan sinus  Follow up with Dr Vaughn for surgical consult after CT scan is completed  Start budesonide rinses - 2 times daily

## 2021-03-11 ENCOUNTER — HOSPITAL ENCOUNTER (OUTPATIENT)
Dept: CT IMAGING | Facility: HOSPITAL | Age: 61
Discharge: HOME | End: 2021-03-11
Attending: NURSE PRACTITIONER | Admitting: NURSE PRACTITIONER
Payer: COMMERCIAL

## 2021-03-11 DIAGNOSIS — J32.4 CHRONIC PANSINUSITIS: ICD-10-CM

## 2021-03-11 DIAGNOSIS — J33.9 NASAL POLYP: ICD-10-CM

## 2021-03-11 DIAGNOSIS — R09.82 POST-NASAL DRAINAGE: ICD-10-CM

## 2021-03-11 PROCEDURE — 70486 CT MAXILLOFACIAL W/O DYE: CPT

## 2021-04-07 PROBLEM — M62.81 MUSCLE WEAKNESS: Status: ACTIVE | Noted: 2020-07-20

## 2021-04-09 ENCOUNTER — IMMUNIZATION (OUTPATIENT)
Dept: FAMILY MEDICINE | Facility: OTHER | Age: 61
End: 2021-04-09
Attending: FAMILY MEDICINE
Payer: COMMERCIAL

## 2021-04-09 PROCEDURE — 91303 PR COVID VAC JANSSEN AD26 0.5ML: CPT

## 2021-04-09 PROCEDURE — 0031A PR COVID VAC JANSSEN AD26 0.5ML: CPT

## 2021-04-12 ENCOUNTER — PREP FOR PROCEDURE (OUTPATIENT)
Dept: OTOLARYNGOLOGY | Facility: OTHER | Age: 61
End: 2021-04-12

## 2021-04-12 ENCOUNTER — OFFICE VISIT (OUTPATIENT)
Dept: OTOLARYNGOLOGY | Facility: OTHER | Age: 61
End: 2021-04-12
Attending: OTOLARYNGOLOGY
Payer: COMMERCIAL

## 2021-04-12 VITALS
WEIGHT: 150 LBS | TEMPERATURE: 96.6 F | OXYGEN SATURATION: 99 % | BODY MASS INDEX: 26.58 KG/M2 | SYSTOLIC BLOOD PRESSURE: 112 MMHG | DIASTOLIC BLOOD PRESSURE: 80 MMHG | HEART RATE: 74 BPM | HEIGHT: 63 IN

## 2021-04-12 DIAGNOSIS — J34.89 CONCHA BULLOSA: ICD-10-CM

## 2021-04-12 DIAGNOSIS — R43.8 HYPOSMIA: ICD-10-CM

## 2021-04-12 DIAGNOSIS — J32.4 CHRONIC PANSINUSITIS: Primary | ICD-10-CM

## 2021-04-12 DIAGNOSIS — R43.9 DISTURBANCE OF SMELL AND TASTE: ICD-10-CM

## 2021-04-12 DIAGNOSIS — J34.3 NASAL TURBINATE HYPERTROPHY: ICD-10-CM

## 2021-04-12 DIAGNOSIS — Z01.818 PREOP GENERAL PHYSICAL EXAM: ICD-10-CM

## 2021-04-12 DIAGNOSIS — J32.9 CHRONIC RECURRENT SINUSITIS: Primary | ICD-10-CM

## 2021-04-12 PROCEDURE — 31231 NASAL ENDOSCOPY DX: CPT | Performed by: OTOLARYNGOLOGY

## 2021-04-12 PROCEDURE — 99214 OFFICE O/P EST MOD 30 MIN: CPT | Mod: 25 | Performed by: OTOLARYNGOLOGY

## 2021-04-12 RX ORDER — BUDESONIDE 0.5 MG/2ML
INHALANT ORAL
Qty: 2 ML | Refills: 12 | Status: SHIPPED | OUTPATIENT
Start: 2021-04-12 | End: 2021-06-21

## 2021-04-12 RX ORDER — FLUTICASONE PROPIONATE 50 MCG
2 SPRAY, SUSPENSION (ML) NASAL DAILY
Qty: 16 G | Refills: 12 | Status: SHIPPED | OUTPATIENT
Start: 2021-04-12 | End: 2021-06-21

## 2021-04-12 RX ORDER — PREDNISONE 10 MG/1
TABLET ORAL
Qty: 12 TABLET | Refills: 1 | Status: SHIPPED | OUTPATIENT
Start: 2021-04-12 | End: 2021-06-21

## 2021-04-12 ASSESSMENT — PAIN SCALES - GENERAL: PAINLEVEL: NO PAIN (0)

## 2021-04-12 ASSESSMENT — MIFFLIN-ST. JEOR: SCORE: 1229.53

## 2021-04-12 NOTE — PROGRESS NOTES
"Otolaryngology Progress Note          Rita Stovall is a 60 year old female  Presents for follow-up of hyposmia despite medical management with budesonide irrigations   symptoms have been present 15 years but have worsened over the past 6 months     She saw Nelly on 224 noted a deep nasal septum to the right and polypoid mucosa on endoscopy CT sinuses was ordered she was encouraged to continue medical management and follow-up with me to discuss possible surgical intervention.  Intradermal allergy testing was discussed     She denies a seasonal component to her symptoms and there is no  known history of significant inhalant or ASA allergies.  No known family history of allergic rhinitis with maternal aunt had nasal polyps    Rita had sinus surgery over 15 to 20 years ago with Dr. Pelayo, described this as a 'roter rooter'    Her primary complaint today is chronic thick postnasal drainage, congestion and decreased sense of smell and taste    CT sinuses dated 3/11/2021 shows polypoid opacification occluding the anterior and posterior ethmoid cells ostiomeatal complexes are obstructed the maxillary sinus is polypoid mucoperiosteal   There are bilateral partially opacified hyacinth bullosa and enlarged middle turbinates   the septum has a caudal deviation to the right  Mucoperiosteal thickening of the smaller right sphenoid sinus.  frontals clear.  The optic nerve is not dehiscent the lamina is intact the skull base is intact keros 2  Mayela 17/24  SNOT 33      Physical Exam  /80 (BP Location: Right arm, Patient Position: Sitting, Cuff Size: Adult Regular)   Pulse 74   Temp 96.6  F (35.9  C) (Tympanic)   Ht 1.6 m (5' 3\")   Wt 68 kg (150 lb)   SpO2 99%   BMI 26.57 kg/m    General - The patient is well nourished and well developed, and appears to have good nutritional status.  Alert and oriented to person and place, interactive.  Head and Face - Normocephalic and atraumatic, with no gross asymmetry noted of the " contour of the facial features.  The facial nerve is intact, with strong symmetric movements.  Neck-no palpable lymphadenopathy or thyroid mass.  Trachea is midline.  Eyes - Extraocular movements intact.   Ears- External auditory canals are patent, tympanic membranes are intact without effusion or worrisome retractions   Nose - Nasal mucosa is pink and moist with no abnormal mucus.  The septum was grossly midline and non-obstructive, turbinates of normal size and position.  No polyps, masses, or purulence noted on examination.  Mouth - Examination of the oral cavity shows pink, healthy, moist mucosa.  No lesions or ulceration noted.  The dentition are in good repair.  The tongue is mobile and midline.  Throat - The walls of the oropharynx were smooth, pink, moist, symmetric, and had no lesions or ulcerations.  The tonsillar pillars and soft palate were symmetric.  The uvula was midline on elevation.      Impression/Plan  Rita Stovall is a 60 year old female    ICD-10-CM    1. Chronic pansinusitis  J32.4 fluticasone (FLONASE) 50 MCG/ACT nasal spray     budesonide (PULMICORT) 0.5 MG/2ML neb solution     predniSONE (DELTASONE) 10 MG tablet   2. 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 Hyposmia  R43.8    5. Disturbance of smell and taste  R43.9          The risks and complications of bilateral endoscopic sinus surgery , excision hyacinth bullosa and turbinate reduction were openly discussed.  The potential risks include bleeding, general anesthesia, infection, scar formation, need for additional surgery, septal perforation, and rarely injury to the eye with the possibility of blindness,  injury to the brain, meningitis or other intracranial complications.  Nonsurgical options were discussed including prolonged antibioitics and/or oral and topical steroids.   Sinus anatomy and sinus disease were reviewed.  CT sinus was reviewed with the patient.  All questions were answered.     Preop  prednisone 5 days before surgery  No guarantees were given that relieved with resolve her hyposmia or face disturbance    Surgery will include bilateral max, ethmoid and right sphenoid   Will require  Medtronic navigation  Shaver turbinate blade    Continue all current medications recommended by me or my staff.    Continue budesonide irrigations.  Verbal and written education on use provided.    The importance of budesonide irrigations postoperatively was reinforced.    The correct use of nasal irrigations as prescribed will prevent synechiae and allow for proper recovery of the sinus mucosa.       Intradermal allergy testing and flexible nasolaryngoscopy  If no improvement after recovery      Blanca Vaughn D.O.  Otolaryngology/Head and Neck Surgery  Allergy

## 2021-04-12 NOTE — PATIENT INSTRUCTIONS
Thank you for allowing Dr. Vaughn and our ENT team to participate in your care.  If your medications are too expensive, please give the nurse a call.  We can possibly change this medication.  If you have a scheduling or an appointment question please contact our Health Unit Coordinator at their direct line [phone].   ALL nursing questions or concerns can be directed to your ENT nurse at: [phone] Manju Graves    Continue with Budesonide 2 x a day.  Start Flonase 2 sprays up each nostril every day.  Start Prednisone 5 days before surgery.  She has called that in today, but don't start until 5 days before surgery.    Budesonide nasal saline irrigation per instructions:  -Obtain Emir Med Sinus rinse over the counter.    -Use warm distilled water and 2 packets of the salt solution that comes with the bottle, dissolve in bottle up to the 240 mL gabriella.  -Add 1 vial of budesonide.  -Irrigate each side of your nose leaning over the sink, using 1/3 to 1/2 the volume of the bottle in each nostril every irrigation.  Irrigate 2 times daily.  -If additional rinses are needed/recommended, you may use the plan Emir Med Sinus irrigation without the use of added budesonide.           Instructions for Sinus Surgery    Recovery - Everyone recovers differently from a general anesthetic.  Symptoms such as fatigue, nausea, light-headedness, and sometimes a low grade fever (up to 100 degrees) are not unusual.  As your body removes the anesthetic drugs from circulation, these symptoms will resolve.  Your nose will be sore after surgery, and you may even have symptoms similar to a sinus infection with headache, congestion, and pressure.  These will resolve with healing.  For several days you may experience bloody drainage from the nose, please use the drip pad as necessary for this.  If there is persistent bleeding, please call the office during business hours or the on call ENT physician after hours.  There are no diet  restrictions after sinus surgery, and you can resume your home medications.      Please do not blow your nose until 1 week after surgery.  At 1 week you may gently blow your nose, unless otherwise indicated by Dr. Vaughn.     Limit your activity to no strenuous activities until I see you for the first follow-up visit in approximately 2 weeks.      Medications - You were sent home with narcotic pain medication.  If you can tolerate the discomfort during your recovery by using just plain Tylenol or ibuprofen (advil), please do so.  However, do not hesitate to use the stronger pain medication if needed.  If you were sent home with an antibiotic, it is primarily used to help the healing process.  If it causes loose bowel movements or other signs of intolerance, it is appropriate to discontinue it.  By far the most important measure you can take to speed recovery, and maximize the chances of long term success of sinus surgery is using the sinus rinses at least three time per day for the first month after surgery.       Start Albino Med saline irrigation tomorrow and use at least 5 times daily.     I recommend 2 albino med bottles, one to add the budesonide to and irrigate with the budesonide rinse twice a day for 2 months.    In the other albino med bottle use only the saline solution, and irrigate with this at least 3 additional times daily.    Perform gentle irrigation for the first week.  Starting 1 week after surgery, you should increase the volume of albino med saline irrigation to each nostril, continuing to use the rinses in an alternating fashion at least 5 times daily.  You cannot use too much of the albino med saline, but please limit budesonide rinses to twice daily.    At 2 weeks after surgery, you may also restart nasal steroids (flonase, nasonex, etc).        Complications - Problems related to sinus surgery almost always are detected during the operation, and special instruction will be given in that situation.   However, unexpected things can happen, and are all related to the structures around the sinus cavities.  Symptoms that should alert you to a possible problem include: severe eye pain or eye swelling, persistent heavy bleeding from the nose, and high fevers with headache and neck pain.  Any of these symptoms should be called into my office or to the on call ENT if after hours.  The most common non-emergency complication of sinus surgery is the formation of scar tissue which can re-block the sinuses.  This is addressed below.    Follow-up -  As you have noted, there are quite a few follow-up visits after sinus surgery.  This is done to aggressively manage the most common complication of this technique, which is scar tissue blocking the sinuses.  These visits will require the examination of your nose and possibly removal of crusts of dry mucous and blood, with possible removal of early scar tissue.  Please prepare for these visits by using your sinus rinses.    If there are any questions or issues with the above, or if there are other issues that concern you, always feel free to call the clinic and I am happy to speak with you as soon as I can.    Blanca Vaughn D.O.  Otolaryngology/Head and Neck Surgery  Allergy    [phone] office

## 2021-04-12 NOTE — LETTER
"    4/12/2021         RE: Rita Stovall  [address]  [place] [address]        Dear Colleague,    Thank you for referring your patient, Rita Stovall, to the [facility]. Please see a copy of my visit note below.    Otolaryngology Progress Note          Rita Stovall is a 60 year old female  Presents for follow-up of hyposmia despite medical management with budesonide irrigations   symptoms have been present 15 years but have worsened over the past 6 months     She saw Nelly on 224 noted a deep nasal septum to the right and polypoid mucosa on endoscopy CT sinuses was ordered she was encouraged to continue medical management and follow-up with me to discuss possible surgical intervention.  Intradermal allergy testing was discussed     She denies a seasonal component to her symptoms and there is no  known history of significant inhalant or ASA allergies.  No known family history of allergic rhinitis with maternal aunt had nasal polyps    Rita had sinus surgery over 15 to 20 years ago with Dr. Pelayo, described this as a 'roter rooter'    Her primary complaint today is chronic thick postnasal drainage, congestion and decreased sense of smell and taste    CT sinuses dated 3/11/2021 shows polypoid opacification occluding the anterior and posterior ethmoid cells ostiomeatal complexes are obstructed the maxillary sinus is polypoid mucoperiosteal   There are bilateral partially opacified hyacinth bullosa and enlarged middle turbinates   the septum has a caudal deviation to the right  Mucoperiosteal thickening of the smaller right sphenoid sinus.  frontals clear.  The optic nerve is not dehiscent the lamina is intact the skull base is intact keros 2  Mayela 17/24  SNOT 33      Physical Exam  /80 (BP Location: Right arm, Patient Position: Sitting, Cuff Size: Adult Regular)   Pulse 74   Temp 96.6  F (35.9  C) (Tympanic)   Ht 1.6 m (5' 3\")   Wt 68 kg (150 lb)   SpO2 99%   BMI 26.57 " kg/m    General - The patient is well nourished and well developed, and appears to have good nutritional status.  Alert and oriented to person and place, interactive.  Head and Face - Normocephalic and atraumatic, with no gross asymmetry noted of the contour of the facial features.  The facial nerve is intact, with strong symmetric movements.  Neck-no palpable lymphadenopathy or thyroid mass.  Trachea is midline.  Eyes - Extraocular movements intact.   Ears- External auditory canals are patent, tympanic membranes are intact without effusion or worrisome retractions   Nose - Nasal mucosa is pink and moist with no abnormal mucus.  The septum was grossly midline and non-obstructive, turbinates of normal size and position.  No polyps, masses, or purulence noted on examination.  Mouth - Examination of the oral cavity shows pink, healthy, moist mucosa.  No lesions or ulceration noted.  The dentition are in good repair.  The tongue is mobile and midline.  Throat - The walls of the oropharynx were smooth, pink, moist, symmetric, and had no lesions or ulcerations.  The tonsillar pillars and soft palate were symmetric.  The uvula was midline on elevation.      Impression/Plan  Rita Stovall is a 60 year old female    ICD-10-CM    1. Chronic pansinusitis  J32.4 fluticasone (FLONASE) 50 MCG/ACT nasal spray     budesonide (PULMICORT) 0.5 MG/2ML neb solution     predniSONE (DELTASONE) 10 MG tablet   2. 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 Hyposmia  R43.8    5. Disturbance of smell and taste  R43.9          The risks and complications of bilateral endoscopic sinus surgery , excision hyacinth bullosa and turbinate reduction were openly discussed.  The potential risks include bleeding, general anesthesia, infection, scar formation, need for additional surgery, septal perforation, and rarely injury to the eye with the possibility of blindness,  injury to the brain, meningitis or other intracranial  complications.  Nonsurgical options were discussed including prolonged antibioitics and/or oral and topical steroids.   Sinus anatomy and sinus disease were reviewed.  CT sinus was reviewed with the patient.  All questions were answered.     Preop prednisone 5 days before surgery  No guarantees were given that relieved with resolve her hyposmia or face disturbance    Surgery will include bilateral max, ethmoid and right sphenoid   Will require  Medtronic navigation  Shaver turbinate blade    Continue all current medications recommended by me or my staff.    Continue budesonide irrigations.  Verbal and written education on use provided.    The importance of budesonide irrigations postoperatively was reinforced.    The correct use of nasal irrigations as prescribed will prevent synechiae and allow for proper recovery of the sinus mucosa.       Intradermal allergy testing and flexible nasolaryngoscopy  If no improvement after recovery      Blanca Vaughn D.O.  Otolaryngology/Head and Neck Surgery  Allergy          Again, thank you for allowing me to participate in the care of your patient.        Sincerely,        Blanca Vaughn MD

## 2021-04-12 NOTE — NURSING NOTE
"Chief Complaint   Patient presents with     RECHECK     Follow Up Hyposmia, Post Nasal Drip, Nasal Polyp, Chronic Pansinusitis       Initial /80 (BP Location: Right arm, Patient Position: Sitting, Cuff Size: Adult Regular)   Pulse 74   Temp 96.6  F (35.9  C) (Tympanic)   Ht 1.6 m (5' 3\")   Wt 68 kg (150 lb)   SpO2 99%   BMI 26.57 kg/m   Estimated body mass index is 26.57 kg/m  as calculated from the following:    Height as of this encounter: 1.6 m (5' 3\").    Weight as of this encounter: 68 kg (150 lb).  Medication Reconciliation: complete  Carolyn Ayoub LPN    "

## 2021-06-16 ENCOUNTER — ANESTHESIA EVENT (OUTPATIENT)
Dept: SURGERY | Facility: HOSPITAL | Age: 61
End: 2021-06-16
Payer: COMMERCIAL

## 2021-06-16 NOTE — ANESTHESIA PREPROCEDURE EVALUATION
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   Past Surgical History:   Procedure Laterality Date     COLONOSCOPY  2013    Procedure: COLONOSCOPY;  COLONOSCOPY WITH Polypectomy;  Surgeon: Sergie Andrade MD;  Location: [facility]     ENT SURGERY        No Known Allergies   Social History     Tobacco Use     Smoking status: Never Smoker     Smokeless tobacco: Never Used   Substance Use Topics     Alcohol use: Yes     Comment: social      Wt Readings from Last 1 Encounters:   21 68 kg (150 lb)        Anesthesia Evaluation   Pt has had prior anesthetic. Type: General and MAC.        ROS/MED HX  ENT/Pulmonary:     (+) PRABHAKAR risk factors, snores loudly,     Neurologic:  - neg neurologic ROS     Cardiovascular:  - neg cardiovascular ROS     METS/Exercise Tolerance: >4 METS    Hematologic:  - neg hematologic  ROS     Musculoskeletal: Comment: Chronic bilateral low back pain with right-sided sciatica  Strain of sacroiliac ligament, initial encounter  Strain of thoracic spine      (+) arthritis,     GI/Hepatic:  - neg GI/hepatic ROS     Renal/Genitourinary:  - neg Renal ROS     Endo:  - neg endo ROS     Psychiatric/Substance Use:     (+) psychiatric history anxiety     Infectious Disease:  - neg infectious disease ROS     Malignancy:  - neg malignancy ROS     Other:  - neg other ROS    (+) , H/O Chronic Pain,        Physical Exam    Airway        Mallampati: II   TM distance: > 3 FB   Neck ROM: full   Mouth opening: > 3 cm    Respiratory Devices and Support         Dental  no notable dental history         Cardiovascular   cardiovascular exam normal          Pulmonary   pulmonary exam normal                 OUTSIDE LABS:  CBC: No results found for: WBC, HGB, HCT, PLT  BMP:   Lab Results   Component Value Date    POTASSIUM 3.7 07/08/2020    CR 0.85 07/08/2020    GLC 97 07/08/2020     COAGS: No results found for: PTT, INR, FIBR  POC:   Lab Results   Component Value Date    HCG Negative 07/26/2013     HEPATIC: No results found for: ALBUMIN, PROTTOTAL, ALT, AST, GGT, ALKPHOS, BILITOTAL, BILIDIRECT, DIANN  OTHER:   Lab Results   Component Value Date    TSH 3.05 07/08/2020       Anesthesia Plan    ASA Status:  2   NPO Status:  NPO Appropriate    Anesthesia Type: General.     - Airway: ETT   Induction: Intravenous, Propofol.   Maintenance: Balanced.        Consents    Anesthesia Plan(s) and associated risks, benefits, and realistic alternatives discussed. Questions answered and patient/representative(s) expressed understanding.     - Discussed with:  Patient         Postoperative Care    Pain management: IV analgesics.   PONV prophylaxis: Background Propofol Infusion, Ondansetron (or other 5HT-3), Dexamethasone or Solumedrol, Scopolamine patch     Comments:    Discussed risks and benefits with patient for general anesthesia including sore throat, nausea, vomiting, aspiration, dental damage, loss of airway, CV complications, stroke, MI, death. Pt wishes to proceed.   H and P 6-16-21            REYES Peres CRNA

## 2021-06-18 ENCOUNTER — OFFICE VISIT (OUTPATIENT)
Dept: FAMILY MEDICINE | Facility: OTHER | Age: 61
End: 2021-06-18
Attending: OTOLARYNGOLOGY
Payer: COMMERCIAL

## 2021-06-18 DIAGNOSIS — Z01.818 PREOP GENERAL PHYSICAL EXAM: ICD-10-CM

## 2021-06-18 LAB
LABORATORY COMMENT REPORT: NORMAL
SARS-COV-2 RNA RESP QL NAA+PROBE: NEGATIVE
SPECIMEN SOURCE: NORMAL

## 2021-06-18 PROCEDURE — U0003 INFECTIOUS AGENT DETECTION BY NUCLEIC ACID (DNA OR RNA); SEVERE ACUTE RESPIRATORY SYNDROME CORONAVIRUS 2 (SARS-COV-2) (CORONAVIRUS DISEASE [COVID-19]), AMPLIFIED PROBE TECHNIQUE, MAKING USE OF HIGH THROUGHPUT TECHNOLOGIES AS DESCRIBED BY CMS-2020-01-R: HCPCS | Performed by: OTOLARYNGOLOGY

## 2021-06-18 PROCEDURE — U0005 INFEC AGEN DETEC AMPLI PROBE: HCPCS | Performed by: OTOLARYNGOLOGY

## 2021-06-23 ENCOUNTER — HOSPITAL ENCOUNTER (OUTPATIENT)
Facility: HOSPITAL | Age: 61
Discharge: HOME | End: 2021-06-23
Attending: OTOLARYNGOLOGY | Admitting: OTOLARYNGOLOGY
Payer: COMMERCIAL

## 2021-06-23 ENCOUNTER — ANESTHESIA (OUTPATIENT)
Dept: SURGERY | Facility: HOSPITAL | Age: 61
End: 2021-06-23
Payer: COMMERCIAL

## 2021-06-23 VITALS
SYSTOLIC BLOOD PRESSURE: 149 MMHG | BODY MASS INDEX: 26.93 KG/M2 | HEART RATE: 61 BPM | RESPIRATION RATE: 16 BRPM | HEIGHT: 63 IN | TEMPERATURE: 97.4 F | DIASTOLIC BLOOD PRESSURE: 84 MMHG | OXYGEN SATURATION: 99 % | WEIGHT: 152 LBS

## 2021-06-23 DIAGNOSIS — J34.89 CONCHA BULLOSA: ICD-10-CM

## 2021-06-23 DIAGNOSIS — J32.9 CHRONIC RECURRENT SINUSITIS: ICD-10-CM

## 2021-06-23 DIAGNOSIS — J34.3 NASAL TURBINATE HYPERTROPHY: ICD-10-CM

## 2021-06-23 DIAGNOSIS — Z98.890 S/P FESS (FUNCTIONAL ENDOSCOPIC SINUS SURGERY): Primary | ICD-10-CM

## 2021-06-23 PROCEDURE — 61782 SCAN PROC CRANIAL EXTRA: CPT | Performed by: OTOLARYNGOLOGY

## 2021-06-23 PROCEDURE — 31257 NSL/SINS NDSC TOT W/SPHENDT: CPT | Mod: 50 | Performed by: OTOLARYNGOLOGY

## 2021-06-23 PROCEDURE — 710N000012 HC RECOVERY PHASE 2, PER MINUTE: Performed by: OTOLARYNGOLOGY

## 2021-06-23 PROCEDURE — 710N000010 HC RECOVERY PHASE 1, LEVEL 2, PER MIN: Performed by: OTOLARYNGOLOGY

## 2021-06-23 PROCEDURE — 99135 ANES COMP CTRLD HYPOTENSION: CPT | Performed by: NURSE ANESTHETIST, CERTIFIED REGISTERED

## 2021-06-23 PROCEDURE — 360N000076 HC SURGERY LEVEL 3, PER MIN: Performed by: OTOLARYNGOLOGY

## 2021-06-23 PROCEDURE — 250N000011 HC RX IP 250 OP 636: Performed by: OTOLARYNGOLOGY

## 2021-06-23 PROCEDURE — 250N000009 HC RX 250: Performed by: NURSE ANESTHETIST, CERTIFIED REGISTERED

## 2021-06-23 PROCEDURE — 30930 THER FX NASAL INF TURBINATE: CPT | Performed by: OTOLARYNGOLOGY

## 2021-06-23 PROCEDURE — 370N000017 HC ANESTHESIA TECHNICAL FEE, PER MIN: Performed by: OTOLARYNGOLOGY

## 2021-06-23 PROCEDURE — 250N000025 HC SEVOFLURANE, PER MIN: Performed by: OTOLARYNGOLOGY

## 2021-06-23 PROCEDURE — 258N000003 HC RX IP 258 OP 636: Performed by: NURSE ANESTHETIST, CERTIFIED REGISTERED

## 2021-06-23 PROCEDURE — 31255 NSL/SINS NDSC W/TOT ETHMDCT: CPT | Performed by: NURSE ANESTHETIST, CERTIFIED REGISTERED

## 2021-06-23 PROCEDURE — 272N000001 HC OR GENERAL SUPPLY STERILE: Performed by: OTOLARYNGOLOGY

## 2021-06-23 PROCEDURE — 250N000013 HC RX MED GY IP 250 OP 250 PS 637: Performed by: OTOLARYNGOLOGY

## 2021-06-23 PROCEDURE — 88304 TISSUE EXAM BY PATHOLOGIST: CPT | Mod: TC | Performed by: OTOLARYNGOLOGY

## 2021-06-23 PROCEDURE — 31256 EXPLORATION MAXILLARY SINUS: CPT | Mod: 50 | Performed by: OTOLARYNGOLOGY

## 2021-06-23 PROCEDURE — 999N000141 HC STATISTIC PRE-PROCEDURE NURSING ASSESSMENT: Performed by: OTOLARYNGOLOGY

## 2021-06-23 PROCEDURE — 88311 DECALCIFY TISSUE: CPT | Mod: TC | Performed by: OTOLARYNGOLOGY

## 2021-06-23 PROCEDURE — 250N000009 HC RX 250: Performed by: OTOLARYNGOLOGY

## 2021-06-23 PROCEDURE — C9046 COCAINE HCL NASAL SOLUTION: HCPCS | Performed by: OTOLARYNGOLOGY

## 2021-06-23 PROCEDURE — 250N000011 HC RX IP 250 OP 636: Performed by: NURSE ANESTHETIST, CERTIFIED REGISTERED

## 2021-06-23 RX ORDER — NALOXONE HYDROCHLORIDE 0.4 MG/ML
0.2 INJECTION, SOLUTION INTRAMUSCULAR; INTRAVENOUS; SUBCUTANEOUS
Status: DISCONTINUED | OUTPATIENT
Start: 2021-06-23 | End: 2021-06-23 | Stop reason: HOSPADM

## 2021-06-23 RX ORDER — DEXAMETHASONE SODIUM PHOSPHATE 10 MG/ML
INJECTION, SOLUTION INTRAMUSCULAR; INTRAVENOUS PRN
Status: DISCONTINUED | OUTPATIENT
Start: 2021-06-23 | End: 2021-06-23

## 2021-06-23 RX ORDER — LIDOCAINE HYDROCHLORIDE 20 MG/ML
INJECTION, SOLUTION INFILTRATION; PERINEURAL PRN
Status: DISCONTINUED | OUTPATIENT
Start: 2021-06-23 | End: 2021-06-23

## 2021-06-23 RX ORDER — NALOXONE HYDROCHLORIDE 0.4 MG/ML
0.4 INJECTION, SOLUTION INTRAMUSCULAR; INTRAVENOUS; SUBCUTANEOUS
Status: DISCONTINUED | OUTPATIENT
Start: 2021-06-23 | End: 2021-06-23 | Stop reason: HOSPADM

## 2021-06-23 RX ORDER — HYDROCODONE BITARTRATE AND ACETAMINOPHEN 7.5; 325 MG/1; MG/1
1 TABLET ORAL EVERY 6 HOURS PRN
Qty: 5 TABLET | Refills: 0 | Status: SHIPPED | OUTPATIENT
Start: 2021-06-23 | End: 2021-06-28

## 2021-06-23 RX ORDER — ONDANSETRON 4 MG/1
4 TABLET, ORALLY DISINTEGRATING ORAL EVERY 30 MIN PRN
Status: DISCONTINUED | OUTPATIENT
Start: 2021-06-23 | End: 2021-06-23 | Stop reason: HOSPADM

## 2021-06-23 RX ORDER — FENTANYL CITRATE 50 UG/ML
25-50 INJECTION, SOLUTION INTRAMUSCULAR; INTRAVENOUS
Status: DISCONTINUED | OUTPATIENT
Start: 2021-06-23 | End: 2021-06-23 | Stop reason: HOSPADM

## 2021-06-23 RX ORDER — PROPOFOL 10 MG/ML
INJECTION, EMULSION INTRAVENOUS CONTINUOUS PRN
Status: DISCONTINUED | OUTPATIENT
Start: 2021-06-23 | End: 2021-06-23

## 2021-06-23 RX ORDER — HYDROCODONE BITARTRATE AND ACETAMINOPHEN 7.5; 325 MG/1; MG/1
1 TABLET ORAL ONCE
Status: COMPLETED | OUTPATIENT
Start: 2021-06-23 | End: 2021-06-23

## 2021-06-23 RX ORDER — ALBUTEROL SULFATE 0.83 MG/ML
2.5 SOLUTION RESPIRATORY (INHALATION) EVERY 4 HOURS PRN
Status: DISCONTINUED | OUTPATIENT
Start: 2021-06-23 | End: 2021-06-23 | Stop reason: HOSPADM

## 2021-06-23 RX ORDER — SODIUM CHLORIDE, SODIUM LACTATE, POTASSIUM CHLORIDE, CALCIUM CHLORIDE 600; 310; 30; 20 MG/100ML; MG/100ML; MG/100ML; MG/100ML
INJECTION, SOLUTION INTRAVENOUS CONTINUOUS
Status: DISCONTINUED | OUTPATIENT
Start: 2021-06-23 | End: 2021-06-23 | Stop reason: HOSPADM

## 2021-06-23 RX ORDER — OXYMETAZOLINE HYDROCHLORIDE 0.05 G/100ML
3 SPRAY NASAL
Status: COMPLETED | OUTPATIENT
Start: 2021-06-23 | End: 2021-06-23

## 2021-06-23 RX ORDER — ONDANSETRON 2 MG/ML
INJECTION INTRAMUSCULAR; INTRAVENOUS PRN
Status: DISCONTINUED | OUTPATIENT
Start: 2021-06-23 | End: 2021-06-23

## 2021-06-23 RX ORDER — PREDNISONE 20 MG/1
TABLET ORAL
Qty: 5 TABLET | Refills: 0 | Status: SHIPPED | OUTPATIENT
Start: 2021-06-24 | End: 2021-07-07

## 2021-06-23 RX ORDER — HYDROMORPHONE HYDROCHLORIDE 1 MG/ML
.3-.5 INJECTION, SOLUTION INTRAMUSCULAR; INTRAVENOUS; SUBCUTANEOUS EVERY 10 MIN PRN
Status: DISCONTINUED | OUTPATIENT
Start: 2021-06-23 | End: 2021-06-23 | Stop reason: HOSPADM

## 2021-06-23 RX ORDER — TRIAMCINOLONE ACETONIDE 40 MG/ML
INJECTION, SUSPENSION INTRA-ARTICULAR; INTRAMUSCULAR PRN
Status: DISCONTINUED | OUTPATIENT
Start: 2021-06-23 | End: 2021-06-23 | Stop reason: HOSPADM

## 2021-06-23 RX ORDER — COCAINE HYDROCHLORIDE 40 MG/ML
SOLUTION NASAL
Status: DISCONTINUED
Start: 2021-06-23 | End: 2021-06-23 | Stop reason: HOSPADM

## 2021-06-23 RX ORDER — ONDANSETRON 2 MG/ML
4 INJECTION INTRAMUSCULAR; INTRAVENOUS EVERY 30 MIN PRN
Status: DISCONTINUED | OUTPATIENT
Start: 2021-06-23 | End: 2021-06-23 | Stop reason: HOSPADM

## 2021-06-23 RX ORDER — LIDOCAINE HYDROCHLORIDE AND EPINEPHRINE 10; 10 MG/ML; UG/ML
INJECTION, SOLUTION INFILTRATION; PERINEURAL PRN
Status: DISCONTINUED | OUTPATIENT
Start: 2021-06-23 | End: 2021-06-23 | Stop reason: HOSPADM

## 2021-06-23 RX ORDER — KETAMINE HYDROCHLORIDE 10 MG/ML
INJECTION INTRAMUSCULAR; INTRAVENOUS PRN
Status: DISCONTINUED | OUTPATIENT
Start: 2021-06-23 | End: 2021-06-23

## 2021-06-23 RX ORDER — SCOLOPAMINE TRANSDERMAL SYSTEM 1 MG/1
1 PATCH, EXTENDED RELEASE TRANSDERMAL ONCE
Status: DISCONTINUED | OUTPATIENT
Start: 2021-06-23 | End: 2021-06-23 | Stop reason: HOSPADM

## 2021-06-23 RX ORDER — EPHEDRINE SULFATE 50 MG/ML
INJECTION, SOLUTION INTRAMUSCULAR; INTRAVENOUS; SUBCUTANEOUS PRN
Status: DISCONTINUED | OUTPATIENT
Start: 2021-06-23 | End: 2021-06-23

## 2021-06-23 RX ORDER — PROPOFOL 10 MG/ML
INJECTION, EMULSION INTRAVENOUS PRN
Status: DISCONTINUED | OUTPATIENT
Start: 2021-06-23 | End: 2021-06-23

## 2021-06-23 RX ORDER — COCAINE HYDROCHLORIDE 40 MG/ML
SOLUTION NASAL PRN
Status: DISCONTINUED | OUTPATIENT
Start: 2021-06-23 | End: 2021-06-23 | Stop reason: HOSPADM

## 2021-06-23 RX ORDER — BUDESONIDE 0.5 MG/2ML
INHALANT ORAL
Qty: 200 ML | Refills: 11 | Status: SHIPPED | OUTPATIENT
Start: 2021-06-23 | End: 2023-03-01

## 2021-06-23 RX ORDER — MEPERIDINE HYDROCHLORIDE 25 MG/ML
12.5 INJECTION INTRAMUSCULAR; INTRAVENOUS; SUBCUTANEOUS
Status: DISCONTINUED | OUTPATIENT
Start: 2021-06-23 | End: 2021-06-23 | Stop reason: HOSPADM

## 2021-06-23 RX ADMIN — HYDROCODONE BITARTRATE AND ACETAMINOPHEN 1 TABLET: 7.5; 325 TABLET ORAL at 09:54

## 2021-06-23 RX ADMIN — PROPOFOL 75 MCG/KG/MIN: 10 INJECTION, EMULSION INTRAVENOUS at 08:00

## 2021-06-23 RX ADMIN — DEXAMETHASONE SODIUM PHOSPHATE 12 MG: 10 INJECTION, SOLUTION INTRAMUSCULAR; INTRAVENOUS at 08:01

## 2021-06-23 RX ADMIN — OXYMETAZOLINE HCL 3 SPRAY: 0.05 SPRAY NASAL at 06:59

## 2021-06-23 RX ADMIN — SCOPALAMINE 1 PATCH: 1 PATCH, EXTENDED RELEASE TRANSDERMAL at 07:20

## 2021-06-23 RX ADMIN — LIDOCAINE HYDROCHLORIDE 40 MG: 20 INJECTION, SOLUTION INFILTRATION; PERINEURAL at 07:51

## 2021-06-23 RX ADMIN — SODIUM CHLORIDE, POTASSIUM CHLORIDE, SODIUM LACTATE AND CALCIUM CHLORIDE: 600; 310; 30; 20 INJECTION, SOLUTION INTRAVENOUS at 07:31

## 2021-06-23 RX ADMIN — Medication 100 MG: at 07:51

## 2021-06-23 RX ADMIN — MIDAZOLAM 2 MG: 1 INJECTION INTRAMUSCULAR; INTRAVENOUS at 07:47

## 2021-06-23 RX ADMIN — ONDANSETRON 4 MG: 2 INJECTION INTRAMUSCULAR; INTRAVENOUS at 07:47

## 2021-06-23 RX ADMIN — Medication 10 MG: at 08:18

## 2021-06-23 RX ADMIN — OXYMETAZOLINE HCL 3 SPRAY: 0.05 SPRAY NASAL at 07:20

## 2021-06-23 RX ADMIN — OXYMETAZOLINE HCL 3 SPRAY: 0.05 SPRAY NASAL at 07:10

## 2021-06-23 RX ADMIN — Medication 5 MG: at 08:23

## 2021-06-23 RX ADMIN — PROPOFOL 200 MG: 10 INJECTION, EMULSION INTRAVENOUS at 07:51

## 2021-06-23 RX ADMIN — KETAMINE HYDROCHLORIDE 30 MG: 10 INJECTION, SOLUTION INTRAMUSCULAR; INTRAVENOUS at 07:51

## 2021-06-23 ASSESSMENT — MIFFLIN-ST. JEOR: SCORE: 1238.6

## 2021-06-23 NOTE — ANESTHESIA CARE TRANSFER NOTE
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    Anesthesia Type:   General     Note:    Oropharynx: spontaneously breathing and oral airway in place  Level of Consciousness: drowsy  Oxygen Supplementation: face mask  Level of Supplemental Oxygen (L/min / FiO2): 8  Independent Airway: airway patency satisfactory and stable  Dentition: dentition unchanged  Vital Signs Stable: post-procedure vital signs reviewed and stable  Report to RN Given: handoff report given  Patient transferred to: PACU    Handoff Report: Identifed the Patient, Identified the Reponsible Provider, Reviewed the pertinent medical history, Discussed the surgical course, Reviewed Intra-OP anesthesia mangement and issues during anesthesia, Set expectations for post-procedure period and Allowed opportunity for questions and acknowledgement of understanding      Vitals: (Last set prior to Anesthesia Care Transfer)  CRNA VITALS  6/23/2021 0830 - 6/23/2021 0901      6/23/2021             Resp Rate (observed):  (!) 2    Resp Rate (set):  8        Electronically Signed By: REYES Sigala CRNA  June 23, 2021  9:01 AM

## 2021-06-23 NOTE — OR NURSING
Patient and responsible adult given discharge instructions with no questions regarding instructions. Lissette score 19/20. Pain level 2/10.  Discharged from unit via wheelchail.. Patient discharged to home.

## 2021-06-23 NOTE — OP NOTE
Otolaryngology Operative Note     Pre-op Diagnosis: Chronic pansinusitis, bilateral hyacinth bullosa, bilateral inferior turbinate hypertrophy  Post-op Diagnosis:  same  Procedures:    1.  Bilateral total ethmoidectomy with polypectomy  2.  Bilateral max antrostomy with tissue removal  3.  Right sphenoidotomy  4.  Bilateral excision hyacinth bullosa  5.  Bilateral submucosal reduction inferior turbinates    Sinus procedures performed with [employer] navigation  Surgeon:  Blanca Vaughn D.O.  Anesthesia:  General endotracheal  EBL:  15 ml  Findings: small polyps and polypoid degeneration of the anterior and posterior ethmoid cavity.  Complications:  none  Condition:  stable     Description of the Procedure  After surgical consent was obtained the patient was brought back to the operating room and laid in a comfortable and supine position.  The patient was administered a general anesthetic by a member of anesthesia.  The table was turned 180 degrees.  The patient was draped in the normal clean fashion and a timeout was taken.  The bed was placed into reverse Trendelenburg positioning.  A timeout was taken.  Cocaine pledgets were placed into the nares for several minutes and removed.  The lateral nasal wall, middle turbinates and inferior turbinates were anesthetized with 1% lidocaine with 1-100,000 epinephrine.    I returned my attention to the turbinate reduction.  Sickle blade was used to make an incision at the anterior edge of the left inferior turbinate.  The [employer] turbinate blade was used to bluntly dissect in the submucosal plane.  The blade was engaged and submucosal reduction was performed the turbinate was outfractured with a [place] this was done in similar fashion on the right side.    Next I used a 30 degree endoscope and evaluated the left and large obstructive hyacinth bullosa which was incised with a sickle and removed with conschal scissors and Sunny's.  Conchal excision was similarly  performed on the right side.  Hemostasis was achieved with scant use of silver nitrate and is adequate.    Next identified the uncinate process on the left side and remove this with a backbiting Blakesley.  Identified the natural maxillary ostia which was enlarged with backbiting Blakesley and the microdebrider blade.  The maxillary sinus has mild polypoid degeneration but no obstructive polyps or purulence.  The sinus was irrigated with Ancef and saline and gently suctioned.    Next I entered the polypoid left ethmoid bulla with a straight suction and remove the ethmoid bulla with the microdebrider blade.  I carefully dissected along the lamina preserving the lamina throughout removing bony septations and small polyps.  There is diffuse mild polypoid degeneration.  The ground lamella was then skeletonized and penetrated inferior medial.  The posterior ethmoid cells were opened identified the skull base which was preserved throughout the procedure and operated from a posterior to anterior fashion removing bony septations and polypoid mucosa.  I turned my attention back to the anterior ethmoids and removed additional superior anterior ethmoid cells with up-biting Blakesley's and 45 Reyes-Cut's.    Next I turned my attention to the right sided sinuses and similarly performed the maxillary antrostomy and total ethmoidectomy with polypectomy with similar findings and results.    On the right side I identified the face of the sphenoid and entered the sphenoid inferior and medial with a straight suction under navigation to open the right sphenoid sinus.  The mucosa is somewhat thickened but is not polypoid and there are no obstructive polyps.  The sinuses were irrigated with Ancef and saline hemostasis is adequate.  I placed half a piece of nasal pore soaked in Kenalog into the lateral nasal wall bilaterally.  She was then had back over anesthesia was awakened and brought to recovery room in stable condition having  tolerated the procedure well.

## 2021-06-23 NOTE — ANESTHESIA POSTPROCEDURE EVALUATION
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    Anesthesia Type:  General    Note:  Disposition: Outpatient   Postop Pain Control: Uneventful            Sign Out: Well controlled pain   PONV: No   Neuro/Psych: Uneventful            Sign Out: Acceptable/Baseline neuro status   Airway/Respiratory: Uneventful            Sign Out: Acceptable/Baseline resp. status   CV/Hemodynamics: Uneventful            Sign Out: Acceptable CV status; No obvious hypovolemia; No obvious fluid overload   Other NRE: NONE   DID A NON-ROUTINE EVENT OCCUR? No           Last vitals:  Vitals:    06/23/21 1000 06/23/21 1015 06/23/21 1030   BP: 152/92 151/95 149/84   Pulse: 65 61    Resp: 16     Temp:      SpO2: 99% 99% 99%       Last vitals prior to Anesthesia Care Transfer:  CRNA VITALS  6/23/2021 0830 - 6/23/2021 0930      6/23/2021             Resp Rate (observed):  (!) 2    Resp Rate (set):  8          Electronically Signed By: REYES Overton CRNA  June 23, 2021  11:39 AM

## 2021-06-23 NOTE — DISCHARGE INSTRUCTIONS
Post-Anesthesia Patient Instructions    IMMEDIATELY FOLLOWING SURGERY:  Do not drive or operate machinery for the first twenty four hours after surgery.  Do not make any important decisions for twenty four hours after surgery or while taking narcotic pain medications or sedatives.  If you develop intractable nausea and vomiting or a severe headache please notify your doctor immediately.    FOLLOW-UP:  Please make an appointment with your surgeon as instructed. You do not need to follow up with anesthesia unless specifically instructed to do so.    WOUND CARE INSTRUCTIONS (if applicable):  Keep a dry clean dressing on the anesthesia/puncture wound site if there is drainage.  Once the wound has quit draining you may leave it open to air.  Generally you should leave the bandage intact for twenty four hours unless there is drainage.  If the epidural site drains for more than 36-48 hours please call the anesthesia department.    QUESTIONS?:  Please feel free to call your physician or the hospital  if you have any questions, and they will be happy to assist you.   Instructions for Sinus Surgery    Recovery - Everyone recovers differently from a general anesthetic.  Symptoms such as fatigue, nausea, light-headedness, and sometimes a low grade fever (up to 100 degrees) are not unusual.  As your body removes the anesthetic drugs from circulation, these symptoms will resolve.  Your nose will be sore after surgery, and you may even have symptoms similar to a sinus infection with headache, congestion, and pressure.  These will resolve with healing.  For several days you may experience bloody drainage from the nose, please use the drip pad as necessary for this.  If there is persistent bleeding, please call the office during business hours or the on call ENT physician after hours.  There are no diet restrictions after sinus surgery, and you can resume your home medications.      Please do not blow your nose until 1  week after surgery.  At 1 week you may gently blow your nose, unless otherwise indicated by Dr. Vaughn.     Limit your activity to no strenuous activities until I see you for the first follow-up visit in approximately 2 weeks.      Medications - You were sent home with narcotic pain medication.  If you can tolerate the discomfort during your recovery by using just plain Tylenol or ibuprofen (advil), please do so.  However, do not hesitate to use the stronger pain medication if needed.  If you were sent home with an antibiotic, it is primarily used to help the healing process.  If it causes loose bowel movements or other signs of intolerance, it is appropriate to discontinue it.  By far the most important measure you can take to speed recovery, and maximize the chances of long term success of sinus surgery is using the sinus rinses at least three time per day for the first month after surgery.       Start Albino Med saline irrigation tomorrow and use at least 5 times daily.     I recommend 2 albino med bottles, one to add the budesonide to and irrigate with the budesonide rinse twice a day for 2 months.    In the other albino med bottle use only the saline solution, and irrigate with this at least 3 additional times daily.    Perform gentle irrigation for the first week.  Starting 1 week after surgery, you should increase the volume of albino med saline irrigation to each nostril, continuing to use the rinses in an alternating fashion at least 5 times daily.  You cannot use too much of the albino med saline, but please limit budesonide rinses to twice daily.    At 2 weeks after surgery, you may also restart nasal steroids (flonase, nasonex, etc).        Complications - Problems related to sinus surgery almost always are detected during the operation, and special instruction will be given in that situation.  However, unexpected things can happen, and are all related to the structures around the sinus cavities.  Symptoms  that should alert you to a possible problem include: severe eye pain or eye swelling, persistent heavy bleeding from the nose, and high fevers with headache and neck pain.  Any of these symptoms should be called into my office or to the on call ENT if after hours.  The most common non-emergency complication of sinus surgery is the formation of scar tissue which can re-block the sinuses.  This is addressed below.    Follow-up -  As you have noted, there are quite a few follow-up visits after sinus surgery.  This is done to aggressively manage the most common complication of this technique, which is scar tissue blocking the sinuses.  These visits will require the examination of your nose and possibly removal of crusts of dry mucous and blood, with possible removal of early scar tissue.  Please prepare for these visits by using your sinus rinses.    If there are any questions or issues with the above, or if there are other issues that concern you, always feel free to call the clinic and I am happy to speak with you as soon as I can.    Blanca Vaughn D.O.  Otolaryngology/Head and Neck Surgery  Allergy    [phone]   extension [phone]

## 2021-06-23 NOTE — ANESTHESIA PROCEDURE NOTES
Airway       Patient location during procedure: OR       Procedure Start/Stop Times: 6/23/2021 7:53 AM and 6/23/2021 7:53 AM  Staff -        CRNA: Dax Adame APRN CRNA       Performed By: CRNA  Consent for Airway        Urgency: elective  Indications and Patient Condition       Indications for airway management: bladimir-procedural       Induction type:intravenous       Mask difficulty assessment: 1 - vent by mask    Final Airway Details       Final airway type: endotracheal airway       Successful airway: ETT - single, Oral and PITA  Endotracheal Airway Details        ETT size (mm): 6.5       Successful intubation technique: direct laryngoscopy       DL Blade Type: MAC 3       Grade View of Cords: 1       Adjucts: stylet       Position: Center       Measured from: lips       Secured at (cm): 19       Bite block used: None    Post intubation assessment        Placement verified by: capnometry, equal breath sounds and chest rise        Number of attempts at approach: 1       Secured with: plastic tape       Ease of procedure: easy       Dentition: Intact and Unchanged    Medication(s) Administered   Medication Administration Time: 6/23/2021 7:53 AM

## 2021-06-23 NOTE — OR NURSING
Awake and alert, denies pain. Incision intact, denies nausea. Vitals stable, transferred to phase 2, Report to Lynne MENDEZ.

## 2021-06-28 LAB — COPATH REPORT: NORMAL

## 2021-07-07 ENCOUNTER — OFFICE VISIT (OUTPATIENT)
Dept: OTOLARYNGOLOGY | Facility: OTHER | Age: 61
End: 2021-07-07
Attending: NURSE PRACTITIONER
Payer: COMMERCIAL

## 2021-07-07 VITALS
SYSTOLIC BLOOD PRESSURE: 136 MMHG | DIASTOLIC BLOOD PRESSURE: 74 MMHG | BODY MASS INDEX: 26.93 KG/M2 | WEIGHT: 152 LBS | HEART RATE: 65 BPM | OXYGEN SATURATION: 98 % | TEMPERATURE: 97.6 F | HEIGHT: 63 IN

## 2021-07-07 DIAGNOSIS — J32.4 CHRONIC PANSINUSITIS: ICD-10-CM

## 2021-07-07 DIAGNOSIS — R09.82 POST-NASAL DRAINAGE: ICD-10-CM

## 2021-07-07 DIAGNOSIS — J33.9 NASAL POLYP: ICD-10-CM

## 2021-07-07 PROCEDURE — 31231 NASAL ENDOSCOPY DX: CPT | Performed by: NURSE PRACTITIONER

## 2021-07-07 PROCEDURE — 99212 OFFICE O/P EST SF 10 MIN: CPT | Mod: 25 | Performed by: NURSE PRACTITIONER

## 2021-07-07 RX ORDER — FLUTICASONE PROPIONATE 50 MCG
SPRAY, SUSPENSION (ML) NASAL
Qty: 16 G | Refills: 3 | Status: SHIPPED | OUTPATIENT
Start: 2021-07-07

## 2021-07-07 ASSESSMENT — PAIN SCALES - GENERAL: PAINLEVEL: NO PAIN (0)

## 2021-07-07 ASSESSMENT — MIFFLIN-ST. JEOR: SCORE: 1238.6

## 2021-07-07 NOTE — NURSING NOTE
"Chief Complaint   Patient presents with     Surgical Followup     S/P FESS, Turbinate Reduction on 6/23/21       Initial /74 (Cuff Size: Adult Regular)   Pulse 65   Temp 97.6  F (36.4  C) (Tympanic)   Ht 1.6 m (5' 3\")   Wt 68.9 kg (152 lb)   LMP  (LMP Unknown)   SpO2 98%   BMI 26.93 kg/m   Estimated body mass index is 26.93 kg/m  as calculated from the following:    Height as of this encounter: 1.6 m (5' 3\").    Weight as of this encounter: 68.9 kg (152 lb).  Medication Reconciliation: complete  Ely Brody LPN    "

## 2021-07-07 NOTE — PATIENT INSTRUCTIONS
Continue rinses 5 times per day  Restart flonase nasal spray   Follow up with Dr Vaughn on 7/22      Thank you for allowing Nelly BAXTER and our ENT team to participate in your care.  If your medications are too expensive, please call my nurse at the number listed below.  We can possibly change this medication.    If you have a scheduling or an appointment question please contact our Health Unit Coordinator at their direct line [phone].   ALL nursing questions or concerns can be directed to my Nurse Carolyn [phone].

## 2021-07-07 NOTE — PROGRESS NOTES
Otolaryngology Note         Chief Complaint:     Patient presents with:  Surgical Followup: S/P FESS, Turbinate Reduction on 6/23/21           History of Present Illness:     Rita Stovall is a 60 year old female who presents today for her first post op FESS appointment.  The patient has done well this week.  There has been some serosanginous drainage from each nare.  There has been a complaints in regards to occasional headaches and crusting.  There has been no fever, chills, large amounts of bleeding, visual changes or neck pain.   Has been using budesonide rinses 2 times per day and plain albino med sinus rinse 3 times per day and tolerating well.          Surgical Details:       Otolaryngology Operative Note      Pre-op Diagnosis: Chronic pansinusitis, bilateral hyacinth bullosa, bilateral inferior turbinate hypertrophy  Post-op Diagnosis:  same  Procedures:    1.  Bilateral total ethmoidectomy with polypectomy  2.  Bilateral max antrostomy with tissue removal  3.  Right sphenoidotomy  4.  Bilateral excision hyacinth bullosa  5.  Bilateral submucosal reduction inferior turbinates     Sinus procedures performed with [employer] navigation  Surgeon:  Blanca Vaughn D.O.  Anesthesia:  General endotracheal  EBL:  15 ml  Findings: small polyps and polypoid degeneration of the anterior and posterior ethmoid cavity.  Complications:  none  Condition:  stable          Medications:     Current Outpatient Rx   Medication Sig Dispense Refill     budesonide (PULMICORT) 0.5 MG/2ML neb solution Squirt entire vial into previously made albino med saline bottle, mix, and irrigate each nostril until entire bottle empty.  Do this twice daily. 200 mL 11     fluticasone (FLONASE) 50 MCG/ACT nasal spray 2 sprays to each nostril twice daily x 1 week, then decrease 2 sprays once daily thereafter 16 g 3     VITAMIN D, CHOLECALCIFEROL, PO Take by mouth daily              Allergies:     Allergies: Patient has no known allergies.           "Past Medical History:     History reviewed. No pertinent past medical history.         Past Surgical History:     Past Surgical History:   Procedure Laterality Date     COLONOSCOPY  7/26/2013    Procedure: COLONOSCOPY;  COLONOSCOPY WITH Polypectomy;  Surgeon: Sergei Andrade MD;  Location: [facility]     ENDOSCOPIC SINUS SURGERY N/A 6/23/2021    Procedure: Bilateral endoscopic sinus surgery;  Surgeon: Blanca Vaughn MD;  Location: [facility]     ENT SURGERY       TURBINOPLASTY Bilateral 6/23/2021    Procedure: Turbinate Reduction, Excision hyacinth bullosa;  Surgeon: Blanca Vaughn MD;  Location: [facility]       ENT family history reviewed         Social History:     Social History     Tobacco Use     Smoking status: Never Smoker     Smokeless tobacco: Never Used   Substance Use Topics     Alcohol use: Yes     Comment: social     Drug use: None            Review of Systems:     ROS: See HPI         Physical Exam:     /74 (Cuff Size: Adult Regular)   Pulse 65   Temp 97.6  F (36.4  C) (Tympanic)   Ht 1.6 m (5' 3\")   Wt 68.9 kg (152 lb)   LMP  (LMP Unknown)   SpO2 98%   BMI 26.93 kg/m      General - The patient is well nourished and well developed, and appears to have good nutritional status.  Alert and oriented to person and place, answers questions and cooperates with examination appropriately.   Head and Face - Normocephalic and atraumatic, with no gross asymmetry noted.  The facial nerve is intact, with strong symmetric movements.  Voice and Breathing - The patient was breathing comfortably without the use of accessory muscles. There was no wheezing, stridor. The patients voice was clear and strong, and had appropriate pitch and quality.  Ears - External ear normal. Canals are patent. Right tympanic membrane is intact without effusion, retraction or mass. Left tympanic membrane is intact without effusion, retraction or mass.  Eyes - Extraocular movements intact, and the pupils were reactive to " light. Sclera were not icteric or injected, conjunctiva were pink and moist.   Mouth - Examination of the oral cavity showed pink, healthy oral mucosa. Dentition in good condition. No lesions or ulcerations noted. The tongue was mobile and midline.   Throat - The walls of the oropharynx were smooth, pink, moist, symmetric, and had no lesions or ulcerations.  The tonsillar pillars and soft palate were symmetric. The uvula was midline on elevation.    Neck - Normal midline excursion of the laryngotracheal complex during swallowing.  Full range of motion on passive movement.  Palpation of the occipital, submental, submandibular, internal jugular chain, and supraclavicular nodes did not demonstrate any abnormal lymph nodes or masses.  Palpation of the thyroid was soft and smooth, with no nodules or goiter appreciated.  The trachea was mobile and midline.  Nose - External contour is symmetric, no gross deflection or scars.  Nasal mucosa is pink and moist with no abnormal mucus.  The septum and turbinates were evaluated with nasal speculum, no polyps, masses, or purulence noted on examination.    To evaluate the nose and sinuses in the post operative state, I performed rigid nasal endoscopy.  I sprayed both nares with 2 sprays lidocaine and neosynephrine.     I began with the RIGHT side using a 0 degree rigid nasal endoscope, and then similarly examined the LEFT side     Findings:  Inferior turbinates:  reduced and lateralized, no crusting  Middle turbinate and middle meatus:  normal post op secretions removed from bilateral excision sites on the MT's, they are healing as expected.  No purulence, no polyposis  The ethmoid cavities were partially suctioned of nasapore packing.  The maxillary antrostomy is open and the ethmoid cavities are healing as expected.     Mucosa is healthy throughout,  no polyps nor polypoid degeneration  The patient tolerated the procedure well            Assessment and Plan:       ICD-10-CM    1.  Post-nasal drainage  R09.82 fluticasone (FLONASE) 50 MCG/ACT nasal spray   2. Nasal polyp  J33.9 fluticasone (FLONASE) 50 MCG/ACT nasal spray   3. Chronic pansinusitis  J32.4 fluticasone (FLONASE) 50 MCG/ACT nasal spray       Continue post operative instructions  Keep scheduled follow up with Dr Vaughn for second post operative exam  Continue albino med saline rinses 5 times per day  Restart flonase nasal spray    Nelly BAXTER  [facility]NT  2:27 PM  July 7, 2021

## 2021-07-07 NOTE — LETTER
7/7/2021         RE: Rita Stovall  [address]  [place] [address]        Dear Colleague,    Thank you for referring your patient, Rita Stovall, to the [facility]. Please see a copy of my visit note below.      Otolaryngology Note         Chief Complaint:     Patient presents with:  Surgical Followup: S/P FESS, Turbinate Reduction on 6/23/21           History of Present Illness:     Rita Stovall is a 60 year old female who presents today for her first post op FESS appointment.  The patient has done well this week.  There has been some serosanginous drainage from each nare.  There has been a complaints in regards to occasional headaches and crusting.  There has been no fever, chills, large amounts of bleeding, visual changes or neck pain.   Has been using budesonide rinses 2 times per day and plain albino med sinus rinse 3 times per day and tolerating well.          Surgical Details:       Otolaryngology Operative Note      Pre-op Diagnosis: Chronic pansinusitis, bilateral hyacinth bullosa, bilateral inferior turbinate hypertrophy  Post-op Diagnosis:  same  Procedures:    1.  Bilateral total ethmoidectomy with polypectomy  2.  Bilateral max antrostomy with tissue removal  3.  Right sphenoidotomy  4.  Bilateral excision hyacinth bullosa  5.  Bilateral submucosal reduction inferior turbinates     Sinus procedures performed with [employer] navigation  Surgeon:  Blanca Vaughn D.O.  Anesthesia:  General endotracheal  EBL:  15 ml  Findings: small polyps and polypoid degeneration of the anterior and posterior ethmoid cavity.  Complications:  none  Condition:  stable          Medications:     Current Outpatient Rx   Medication Sig Dispense Refill     budesonide (PULMICORT) 0.5 MG/2ML neb solution Squirt entire vial into previously made albino med saline bottle, mix, and irrigate each nostril until entire bottle empty.  Do this twice daily. 200 mL 11     fluticasone (FLONASE) 50 MCG/ACT  "nasal spray 2 sprays to each nostril twice daily x 1 week, then decrease 2 sprays once daily thereafter 16 g 3     VITAMIN D, CHOLECALCIFEROL, PO Take by mouth daily              Allergies:     Allergies: Patient has no known allergies.          Past Medical History:     History reviewed. No pertinent past medical history.         Past Surgical History:     Past Surgical History:   Procedure Laterality Date     COLONOSCOPY  7/26/2013    Procedure: COLONOSCOPY;  COLONOSCOPY WITH Polypectomy;  Surgeon: Sergei Andrade MD;  Location: [facility]     ENDOSCOPIC SINUS SURGERY N/A 6/23/2021    Procedure: Bilateral endoscopic sinus surgery;  Surgeon: Blanca Vaughn MD;  Location: [facility]     ENT SURGERY       TURBINOPLASTY Bilateral 6/23/2021    Procedure: Turbinate Reduction, Excision hyacinth bullosa;  Surgeon: Blanca Vaughn MD;  Location: [facility]       ENT family history reviewed         Social History:     Social History     Tobacco Use     Smoking status: Never Smoker     Smokeless tobacco: Never Used   Substance Use Topics     Alcohol use: Yes     Comment: social     Drug use: None            Review of Systems:     ROS: See HPI         Physical Exam:     /74 (Cuff Size: Adult Regular)   Pulse 65   Temp 97.6  F (36.4  C) (Tympanic)   Ht 1.6 m (5' 3\")   Wt 68.9 kg (152 lb)   LMP  (LMP Unknown)   SpO2 98%   BMI 26.93 kg/m      General - The patient is well nourished and well developed, and appears to have good nutritional status.  Alert and oriented to person and place, answers questions and cooperates with examination appropriately.   Head and Face - Normocephalic and atraumatic, with no gross asymmetry noted.  The facial nerve is intact, with strong symmetric movements.  Voice and Breathing - The patient was breathing comfortably without the use of accessory muscles. There was no wheezing, stridor. The patients voice was clear and strong, and had appropriate pitch and quality.  Ears - External ear " normal. Canals are patent. Right tympanic membrane is intact without effusion, retraction or mass. Left tympanic membrane is intact without effusion, retraction or mass.  Eyes - Extraocular movements intact, and the pupils were reactive to light. Sclera were not icteric or injected, conjunctiva were pink and moist.   Mouth - Examination of the oral cavity showed pink, healthy oral mucosa. Dentition in good condition. No lesions or ulcerations noted. The tongue was mobile and midline.   Throat - The walls of the oropharynx were smooth, pink, moist, symmetric, and had no lesions or ulcerations.  The tonsillar pillars and soft palate were symmetric. The uvula was midline on elevation.    Neck - Normal midline excursion of the laryngotracheal complex during swallowing.  Full range of motion on passive movement.  Palpation of the occipital, submental, submandibular, internal jugular chain, and supraclavicular nodes did not demonstrate any abnormal lymph nodes or masses.  Palpation of the thyroid was soft and smooth, with no nodules or goiter appreciated.  The trachea was mobile and midline.  Nose - External contour is symmetric, no gross deflection or scars.  Nasal mucosa is pink and moist with no abnormal mucus.  The septum and turbinates were evaluated with nasal speculum, no polyps, masses, or purulence noted on examination.    To evaluate the nose and sinuses in the post operative state, I performed rigid nasal endoscopy.  I sprayed both nares with 2 sprays lidocaine and neosynephrine.     I began with the RIGHT side using a 0 degree rigid nasal endoscope, and then similarly examined the LEFT side     Findings:  Inferior turbinates:  reduced and lateralized, no crusting  Middle turbinate and middle meatus:  normal post op secretions removed from bilateral excision sites on the MT's, they are healing as expected.  No purulence, no polyposis  The ethmoid cavities were partially suctioned of nasapore packing.  The  maxillary antrostomy is open and the ethmoid cavities are healing as expected.     Mucosa is healthy throughout,  no polyps nor polypoid degeneration  The patient tolerated the procedure well            Assessment and Plan:       ICD-10-CM    1. Post-nasal drainage  R09.82 fluticasone (FLONASE) 50 MCG/ACT nasal spray   2. Nasal polyp  J33.9 fluticasone (FLONASE) 50 MCG/ACT nasal spray   3. Chronic pansinusitis  J32.4 fluticasone (FLONASE) 50 MCG/ACT nasal spray       Continue post operative instructions  Keep scheduled follow up with Dr Vaughn for second post operative exam  Continue albino med saline rinses 5 times per day  Restart flonase nasal spray    Nelly BAXTER  [facility] ENT  2:27 PM  July 7, 2021            Again, thank you for allowing me to participate in the care of your patient.        Sincerely,        Nelly Saavedra NP

## 2021-07-21 NOTE — PROGRESS NOTES
"Otolaryngology Progress Note          Rita Stovall is a 61 year old female    Presents for her 1 month postoperative visit status post endoscopic sinus surgery.    Procedures on 6/23/2021  Procedures:    1.  Bilateral total ethmoidectomy with polypectomy  2.  Bilateral max antrostomy with tissue removal  3.  Right sphenoidotomy  4.  Bilateral excision hyacinth bullosa  5.  Bilateral submucosal reduction inferior turbinates     Sinus procedures performed with [employer] navigation  Surgeon:  Blanca Vaughn D.O.  Anesthesia:  General endotracheal  EBL:  15 ml  Findings: small polyps and polypoid degeneration of the anterior and posterior ethmoid cavity.    Bilateral sinus contents:   - Inflammatory polyp   - Fragments of sinonasal mucosa with chronic inflammation   - Eosinophils focally number up to 40 per hpf         She feels great  No congestion, breathing well  No bleeding  No pain      Physical Exam  /76   Pulse 64   Temp 97.1  F (36.2  C) (Tympanic)   Ht 1.6 m (5' 3\")   Wt 68.9 kg (152 lb)   LMP  (LMP Unknown)   SpO2 99%   BMI 26.93 kg/m    General - The patient is well nourished and well developed, and appears to have good nutritional status.  Alert and oriented to person and place, interactive.  Head and Face - Normocephalic and atraumatic, with no gross asymmetry noted of the contour of the facial features.  The facial nerve is intact, with strong symmetric movements.  Eyes - Extraocular movements intact.   Ears- External auditory canals are patent, tympanic membranes are intact without effusion or worrisome retractions   Nose - Nasal mucosa is pink and moist with no abnormal mucus.  The septum was grossly midline and non-obstructive, turbinates of normal size and position.  No polyps, masses, or purulence noted on examination.      To evaluate the nose and sinuses in the post operative state, I performed rigid nasal endoscopy. The nose was anesthetized with home afrin or topical " lidocaine and neosynephrine in the office.    I began with the LEFT side using a 0 degree rigid nasal endoscope, and then similarly examined the RIGHT side    Findings:  Inferior turbinates:  Lateralized  I used an 8 Enciso and Raman forceps to remove normal postoperative secretions from the lateral nasal wall  Middle turbinate and middle meatus:  No purulence, no polyposis, no synechiae  Antrostomy clear  Ethmoid cavity clear  Mucosa is  healthy throughout without polyps nor polypoid degeneration    Impression/Plan  Rita Stovall is a 61 year old female    ICD-10-CM    1. S/P FESS (functional endoscopic sinus surgery)  Z98.890      Use both rinses once a day for 1 month then as needed.   Rita's sinuses are healthy  She can present as needed for recurrent sinusitis that does not respond to medical management    Blanca Vaughn D.O.  Otolaryngology/Head and Neck Surgery  Allergy

## 2021-07-22 ENCOUNTER — OFFICE VISIT (OUTPATIENT)
Dept: OTOLARYNGOLOGY | Facility: OTHER | Age: 61
End: 2021-07-22
Attending: OTOLARYNGOLOGY
Payer: COMMERCIAL

## 2021-07-22 VITALS
HEIGHT: 63 IN | BODY MASS INDEX: 26.93 KG/M2 | WEIGHT: 152 LBS | HEART RATE: 64 BPM | TEMPERATURE: 97.1 F | SYSTOLIC BLOOD PRESSURE: 116 MMHG | OXYGEN SATURATION: 99 % | DIASTOLIC BLOOD PRESSURE: 76 MMHG

## 2021-07-22 DIAGNOSIS — Z98.890 S/P FESS (FUNCTIONAL ENDOSCOPIC SINUS SURGERY): Primary | ICD-10-CM

## 2021-07-22 PROCEDURE — 99212 OFFICE O/P EST SF 10 MIN: CPT | Mod: 25 | Performed by: OTOLARYNGOLOGY

## 2021-07-22 PROCEDURE — 31231 NASAL ENDOSCOPY DX: CPT | Performed by: OTOLARYNGOLOGY

## 2021-07-22 ASSESSMENT — PAIN SCALES - GENERAL: PAINLEVEL: NO PAIN (0)

## 2021-07-22 ASSESSMENT — MIFFLIN-ST. JEOR: SCORE: 1233.6

## 2021-07-22 NOTE — LETTER
"    7/22/2021         RE: Rita Stovall  [address]  [place] [address]        Dear Colleague,    Thank you for referring your patient, Rita Stovall, to the [facility]. Please see a copy of my visit note below.    Otolaryngology Progress Note          Rita Stovall is a 61 year old female    Presents for her 1 month postoperative visit status post endoscopic sinus surgery.    Procedures on 6/23/2021  Procedures:    1.  Bilateral total ethmoidectomy with polypectomy  2.  Bilateral max antrostomy with tissue removal  3.  Right sphenoidotomy  4.  Bilateral excision hyacinth bullosa  5.  Bilateral submucosal reduction inferior turbinates     Sinus procedures performed with [employer] navigation  Surgeon:  Blanca Vaughn D.O.  Anesthesia:  General endotracheal  EBL:  15 ml  Findings: small polyps and polypoid degeneration of the anterior and posterior ethmoid cavity.    Bilateral sinus contents:   - Inflammatory polyp   - Fragments of sinonasal mucosa with chronic inflammation   - Eosinophils focally number up to 40 per hpf         She feels great  No congestion, breathing well  No bleeding  No pain      Physical Exam  /76   Pulse 64   Temp 97.1  F (36.2  C) (Tympanic)   Ht 1.6 m (5' 3\")   Wt 68.9 kg (152 lb)   LMP  (LMP Unknown)   SpO2 99%   BMI 26.93 kg/m    General - The patient is well nourished and well developed, and appears to have good nutritional status.  Alert and oriented to person and place, interactive.  Head and Face - Normocephalic and atraumatic, with no gross asymmetry noted of the contour of the facial features.  The facial nerve is intact, with strong symmetric movements.  Eyes - Extraocular movements intact.   Ears- External auditory canals are patent, tympanic membranes are intact without effusion or worrisome retractions   Nose - Nasal mucosa is pink and moist with no abnormal mucus.  The septum was grossly midline and non-obstructive, turbinates " of normal size and position.  No polyps, masses, or purulence noted on examination.      To evaluate the nose and sinuses in the post operative state, I performed rigid nasal endoscopy. The nose was anesthetized with home afrin or topical lidocaine and neosynephrine in the office.    I began with the LEFT side using a 0 degree rigid nasal endoscope, and then similarly examined the RIGHT side    Findings:  Inferior turbinates:  Lateralized  I used an 8 Enciso and Raman forceps to remove normal postoperative secretions from the lateral nasal wall  Middle turbinate and middle meatus:  No purulence, no polyposis, no synechiae  Antrostomy clear  Ethmoid cavity clear  Mucosa is  healthy throughout without polyps nor polypoid degeneration    Impression/Plan  Rita Stovall is a 61 year old female    ICD-10-CM    1. S/P FESS (functional endoscopic sinus surgery)  Z98.890      Use both rinses once a day for 1 month then as needed.   Rita's sinuses are healthy  She can present as needed for recurrent sinusitis that does not respond to medical management    Blanca Vaughn D.O.  Otolaryngology/Head and Neck Surgery  Allergy              Again, thank you for allowing me to participate in the care of your patient.        Sincerely,        Blanca Vaughn MD

## 2021-07-22 NOTE — NURSING NOTE
"Chief Complaint   Patient presents with     Surgical Followup     S/P FESS, Excision Faith Bullosa, Turbinate Reduction on 6/23/21       Initial /76   Pulse 64   Temp 97.1  F (36.2  C) (Tympanic)   Ht 1.6 m (5' 3\")   Wt 68.9 kg (152 lb)   LMP  (LMP Unknown)   SpO2 99%   BMI 26.93 kg/m   Estimated body mass index is 26.93 kg/m  as calculated from the following:    Height as of this encounter: 1.6 m (5' 3\").    Weight as of this encounter: 68.9 kg (152 lb).  Medication Reconciliation: complete  Nelly Lizama LPN  "

## 2022-03-22 ENCOUNTER — HOSPITAL ENCOUNTER (EMERGENCY)
Facility: HOSPITAL | Age: 62
Discharge: HOME | End: 2022-03-23
Attending: EMERGENCY MEDICINE | Admitting: EMERGENCY MEDICINE
Payer: COMMERCIAL

## 2022-03-22 ENCOUNTER — APPOINTMENT (OUTPATIENT)
Dept: ULTRASOUND IMAGING | Facility: HOSPITAL | Age: 62
End: 2022-03-22
Attending: EMERGENCY MEDICINE
Payer: COMMERCIAL

## 2022-03-22 ENCOUNTER — APPOINTMENT (OUTPATIENT)
Dept: GENERAL RADIOLOGY | Facility: HOSPITAL | Age: 62
End: 2022-03-22
Attending: EMERGENCY MEDICINE
Payer: COMMERCIAL

## 2022-03-22 DIAGNOSIS — J40 BRONCHITIS: ICD-10-CM

## 2022-03-22 LAB
ALBUMIN SERPL-MCNC: 3.6 G/DL (ref 3.4–5)
ALBUMIN UR-MCNC: NEGATIVE MG/DL
ALP SERPL-CCNC: 65 U/L (ref 40–150)
ALT SERPL W P-5'-P-CCNC: 22 U/L (ref 0–50)
ANION GAP SERPL CALCULATED.3IONS-SCNC: 5 MMOL/L (ref 3–14)
APPEARANCE UR: CLEAR
APTT PPP: 27 SECONDS (ref 22–38)
AST SERPL W P-5'-P-CCNC: 13 U/L (ref 0–45)
BASE EXCESS BLDV CALC-SCNC: 1.6 MMOL/L (ref -7.7–1.9)
BASOPHILS # BLD AUTO: 0.1 10E3/UL (ref 0–0.2)
BASOPHILS NFR BLD AUTO: 1 %
BILIRUB SERPL-MCNC: 0.2 MG/DL (ref 0.2–1.3)
BILIRUB UR QL STRIP: NEGATIVE
BUN SERPL-MCNC: 20 MG/DL (ref 7–30)
CALCIUM SERPL-MCNC: 8.8 MG/DL (ref 8.5–10.1)
CHLORIDE BLD-SCNC: 111 MMOL/L (ref 94–109)
CO2 SERPL-SCNC: 27 MMOL/L (ref 20–32)
COLOR UR AUTO: ABNORMAL
CREAT SERPL-MCNC: 0.73 MG/DL (ref 0.52–1.04)
EOSINOPHIL # BLD AUTO: 0.6 10E3/UL (ref 0–0.7)
EOSINOPHIL NFR BLD AUTO: 7 %
ERYTHROCYTE [DISTWIDTH] IN BLOOD BY AUTOMATED COUNT: 13 % (ref 10–15)
FLUAV RNA SPEC QL NAA+PROBE: NEGATIVE
FLUBV RNA RESP QL NAA+PROBE: NEGATIVE
GFR SERPL CREATININE-BSD FRML MDRD: >90 ML/MIN/1.73M2
GLUCOSE BLD-MCNC: 102 MG/DL (ref 70–99)
GLUCOSE UR STRIP-MCNC: NEGATIVE MG/DL
HCO3 BLDV-SCNC: 28 MMOL/L (ref 21–28)
HCT VFR BLD AUTO: 41.1 % (ref 35–47)
HGB BLD-MCNC: 14.3 G/DL (ref 11.7–15.7)
HGB UR QL STRIP: NEGATIVE
IMM GRANULOCYTES # BLD: 0 10E3/UL
IMM GRANULOCYTES NFR BLD: 0 %
KETONES UR STRIP-MCNC: NEGATIVE MG/DL
LACTATE SERPL-SCNC: 0.9 MMOL/L (ref 0.7–2)
LEUKOCYTE ESTERASE UR QL STRIP: NEGATIVE
LIPASE SERPL-CCNC: 165 U/L (ref 73–393)
LYMPHOCYTES # BLD AUTO: 1.6 10E3/UL (ref 0.8–5.3)
LYMPHOCYTES NFR BLD AUTO: 19 %
MCH RBC QN AUTO: 31.4 PG (ref 26.5–33)
MCHC RBC AUTO-ENTMCNC: 34.8 G/DL (ref 31.5–36.5)
MCV RBC AUTO: 90 FL (ref 78–100)
MONOCYTES # BLD AUTO: 0.7 10E3/UL (ref 0–1.3)
MONOCYTES NFR BLD AUTO: 9 %
MUCOUS THREADS #/AREA URNS LPF: PRESENT /LPF
NEUTROPHILS # BLD AUTO: 5.3 10E3/UL (ref 1.6–8.3)
NEUTROPHILS NFR BLD AUTO: 64 %
NITRATE UR QL: NEGATIVE
NRBC # BLD AUTO: 0 10E3/UL
NRBC BLD AUTO-RTO: 0 /100
NT-PROBNP SERPL-MCNC: 48 PG/ML (ref 0–900)
O2/TOTAL GAS SETTING VFR VENT: 2 %
OXYHGB MFR BLDV: 84 % (ref 70–75)
PCO2 BLDV: 49 MM HG (ref 40–50)
PH BLDV: 7.36 [PH] (ref 7.32–7.43)
PH UR STRIP: 6.5 [PH] (ref 4.7–8)
PLATELET # BLD AUTO: 260 10E3/UL (ref 150–450)
PO2 BLDV: 51 MM HG (ref 25–47)
POTASSIUM BLD-SCNC: 3.7 MMOL/L (ref 3.4–5.3)
PROT SERPL-MCNC: 7 G/DL (ref 6.8–8.8)
RBC # BLD AUTO: 4.55 10E6/UL (ref 3.8–5.2)
RBC URINE: <1 /HPF
RSV RNA SPEC NAA+PROBE: NEGATIVE
SARS-COV-2 RNA RESP QL NAA+PROBE: NEGATIVE
SODIUM SERPL-SCNC: 143 MMOL/L (ref 133–144)
SP GR UR STRIP: 1.01 (ref 1–1.03)
SQUAMOUS EPITHELIAL: 0 /HPF
TROPONIN I SERPL HS-MCNC: 5 NG/L
UROBILINOGEN UR STRIP-MCNC: NORMAL MG/DL
WBC # BLD AUTO: 8.3 10E3/UL (ref 4–11)
WBC URINE: <1 /HPF

## 2022-03-22 PROCEDURE — 85025 COMPLETE CBC W/AUTO DIFF WBC: CPT | Performed by: EMERGENCY MEDICINE

## 2022-03-22 PROCEDURE — 81001 URINALYSIS AUTO W/SCOPE: CPT | Performed by: EMERGENCY MEDICINE

## 2022-03-22 PROCEDURE — 83605 ASSAY OF LACTIC ACID: CPT | Performed by: EMERGENCY MEDICINE

## 2022-03-22 PROCEDURE — 87637 SARSCOV2&INF A&B&RSV AMP PRB: CPT | Performed by: EMERGENCY MEDICINE

## 2022-03-22 PROCEDURE — 71045 X-RAY EXAM CHEST 1 VIEW: CPT

## 2022-03-22 PROCEDURE — 99285 EMERGENCY DEPT VISIT HI MDM: CPT | Mod: 25 | Performed by: EMERGENCY MEDICINE

## 2022-03-22 PROCEDURE — 250N000009 HC RX 250: Performed by: EMERGENCY MEDICINE

## 2022-03-22 PROCEDURE — 99285 EMERGENCY DEPT VISIT HI MDM: CPT | Mod: 25

## 2022-03-22 PROCEDURE — 94640 AIRWAY INHALATION TREATMENT: CPT | Mod: 76

## 2022-03-22 PROCEDURE — 999N000157 HC STATISTIC RCP TIME EA 10 MIN

## 2022-03-22 PROCEDURE — 83690 ASSAY OF LIPASE: CPT | Performed by: EMERGENCY MEDICINE

## 2022-03-22 PROCEDURE — C9803 HOPD COVID-19 SPEC COLLECT: HCPCS

## 2022-03-22 PROCEDURE — 85379 FIBRIN DEGRADATION QUANT: CPT | Performed by: EMERGENCY MEDICINE

## 2022-03-22 PROCEDURE — 36415 COLL VENOUS BLD VENIPUNCTURE: CPT | Performed by: EMERGENCY MEDICINE

## 2022-03-22 PROCEDURE — 83880 ASSAY OF NATRIURETIC PEPTIDE: CPT | Performed by: EMERGENCY MEDICINE

## 2022-03-22 PROCEDURE — 85730 THROMBOPLASTIN TIME PARTIAL: CPT | Performed by: EMERGENCY MEDICINE

## 2022-03-22 PROCEDURE — 93005 ELECTROCARDIOGRAM TRACING: CPT

## 2022-03-22 PROCEDURE — 82805 BLOOD GASES W/O2 SATURATION: CPT | Performed by: EMERGENCY MEDICINE

## 2022-03-22 PROCEDURE — 93308 TTE F-UP OR LMTD: CPT | Mod: 26 | Performed by: EMERGENCY MEDICINE

## 2022-03-22 PROCEDURE — 93010 ELECTROCARDIOGRAM REPORT: CPT | Performed by: INTERNAL MEDICINE

## 2022-03-22 PROCEDURE — 93308 TTE F-UP OR LMTD: CPT | Mod: 26

## 2022-03-22 PROCEDURE — 94640 AIRWAY INHALATION TREATMENT: CPT

## 2022-03-22 PROCEDURE — 84484 ASSAY OF TROPONIN QUANT: CPT | Performed by: EMERGENCY MEDICINE

## 2022-03-22 PROCEDURE — 80053 COMPREHEN METABOLIC PANEL: CPT | Performed by: EMERGENCY MEDICINE

## 2022-03-22 RX ORDER — DEXAMETHASONE SODIUM PHOSPHATE 10 MG/ML
10 INJECTION INTRAMUSCULAR; INTRAVENOUS ONCE
Status: COMPLETED | OUTPATIENT
Start: 2022-03-22 | End: 2022-03-22

## 2022-03-22 RX ORDER — IPRATROPIUM BROMIDE AND ALBUTEROL SULFATE 2.5; .5 MG/3ML; MG/3ML
3 SOLUTION RESPIRATORY (INHALATION) ONCE
Status: COMPLETED | OUTPATIENT
Start: 2022-03-22 | End: 2022-03-22

## 2022-03-22 RX ORDER — ESTRADIOL 0.1 MG/G
CREAM VAGINAL
COMMUNITY
Start: 2021-09-14

## 2022-03-22 RX ADMIN — IPRATROPIUM BROMIDE AND ALBUTEROL SULFATE 3 ML: .5; 3 SOLUTION RESPIRATORY (INHALATION) at 23:49

## 2022-03-22 RX ADMIN — DEXAMETHASONE SODIUM PHOSPHATE 10 MG: 10 INJECTION INTRAMUSCULAR; INTRAVENOUS at 23:46

## 2022-03-22 RX ADMIN — IPRATROPIUM BROMIDE AND ALBUTEROL SULFATE 3 ML: .5; 3 SOLUTION RESPIRATORY (INHALATION) at 22:31

## 2022-03-22 NOTE — LETTER
March 23, 2022      To Whom It May Concern:      Rita Stovall was seen in our Emergency Department today, 03/23/22.  I expect her condition to improve over the next several days.  She may return to work/school when improved, no earlier than 3/28/2022.    Sincerely,        Gera Lowery MD

## 2022-03-22 NOTE — LETTER
March 23, 2022      To Whom It May Concern:      Rita Stovall was seen in our Emergency Department today, 03/23/22.  I expect her condition to improve over the next few days.  She may return to work/school when improved, no earlier than 3/28/2022.    Sincerely,        Gera Lowery MD

## 2022-03-23 ENCOUNTER — APPOINTMENT (OUTPATIENT)
Dept: CT IMAGING | Facility: HOSPITAL | Age: 62
End: 2022-03-23
Attending: EMERGENCY MEDICINE
Payer: COMMERCIAL

## 2022-03-23 ENCOUNTER — DOCUMENTATION ONLY (OUTPATIENT)
Dept: HOME HEALTH SERVICES | Facility: CLINIC | Age: 62
End: 2022-03-23

## 2022-03-23 VITALS
SYSTOLIC BLOOD PRESSURE: 129 MMHG | RESPIRATION RATE: 16 BRPM | TEMPERATURE: 98.8 F | OXYGEN SATURATION: 94 % | DIASTOLIC BLOOD PRESSURE: 80 MMHG | HEART RATE: 101 BPM

## 2022-03-23 LAB
D DIMER PPP FEU-MCNC: 0.69 UG/ML FEU (ref 0–0.5)
TROPONIN I SERPL HS-MCNC: 3 NG/L

## 2022-03-23 PROCEDURE — 94640 AIRWAY INHALATION TREATMENT: CPT | Mod: 76

## 2022-03-23 PROCEDURE — 250N000013 HC RX MED GY IP 250 OP 250 PS 637: Performed by: EMERGENCY MEDICINE

## 2022-03-23 PROCEDURE — 250N000011 HC RX IP 250 OP 636: Performed by: EMERGENCY MEDICINE

## 2022-03-23 PROCEDURE — 71275 CT ANGIOGRAPHY CHEST: CPT

## 2022-03-23 PROCEDURE — 250N000009 HC RX 250: Performed by: EMERGENCY MEDICINE

## 2022-03-23 PROCEDURE — 84484 ASSAY OF TROPONIN QUANT: CPT | Performed by: EMERGENCY MEDICINE

## 2022-03-23 PROCEDURE — 999N000157 HC STATISTIC RCP TIME EA 10 MIN

## 2022-03-23 PROCEDURE — 36415 COLL VENOUS BLD VENIPUNCTURE: CPT | Performed by: EMERGENCY MEDICINE

## 2022-03-23 RX ORDER — AZITHROMYCIN 250 MG/1
250 TABLET, FILM COATED ORAL DAILY
Qty: 4 TABLET | Refills: 0 | Status: SHIPPED | OUTPATIENT
Start: 2022-03-23 | End: 2022-03-27

## 2022-03-23 RX ORDER — IOPAMIDOL 755 MG/ML
55 INJECTION, SOLUTION INTRAVASCULAR ONCE
Status: COMPLETED | OUTPATIENT
Start: 2022-03-23 | End: 2022-03-23

## 2022-03-23 RX ORDER — AZITHROMYCIN 250 MG/1
500 TABLET, FILM COATED ORAL ONCE
Status: COMPLETED | OUTPATIENT
Start: 2022-03-23 | End: 2022-03-23

## 2022-03-23 RX ORDER — IPRATROPIUM BROMIDE AND ALBUTEROL SULFATE 2.5; .5 MG/3ML; MG/3ML
3 SOLUTION RESPIRATORY (INHALATION) ONCE
Status: COMPLETED | OUTPATIENT
Start: 2022-03-23 | End: 2022-03-23

## 2022-03-23 RX ORDER — PREDNISONE 20 MG/1
TABLET ORAL
Qty: 10 TABLET | Refills: 0 | Status: SHIPPED | OUTPATIENT
Start: 2022-03-23 | End: 2023-03-01

## 2022-03-23 RX ADMIN — AZITHROMYCIN MONOHYDRATE 500 MG: 250 TABLET ORAL at 04:27

## 2022-03-23 RX ADMIN — IPRATROPIUM BROMIDE AND ALBUTEROL SULFATE 3 ML: .5; 3 SOLUTION RESPIRATORY (INHALATION) at 04:08

## 2022-03-23 RX ADMIN — IOPAMIDOL 55 ML: 755 INJECTION, SOLUTION INTRAVENOUS at 02:19

## 2022-03-23 ASSESSMENT — ENCOUNTER SYMPTOMS
CHILLS: 0
FEVER: 0
COUGH: 1
SHORTNESS OF BREATH: 1

## 2022-03-23 NOTE — DISCHARGE INSTRUCTIONS
What to expect when you have contrast    During your exam, we will inject  contrast  into your vein or artery. (Contrast is a clear liquid with iodine in it. It shows up on X-rays.)    You may feel warm or hot. You may have a metal taste in your mouth and a slight upset stomach. You may also feel pressure near the kidneys and bladder. These effects will last about 1 to 3 minutes.    Please tell us if you have:   Sneezing    Itching   Hives    Swelling in the face   A hoarse voice   Breathing problems   Other new symptoms    Serious problems are rare.  They may include:   Irregular heartbeat    Seizures   Kidney failure             Tissue damage   Shock     Death    If you have any problems during the exam, we  will treat them right away.    When you get home    Call your hospital if you have any new symptoms in the next 2 days, like hives or swelling. (Phone numbers are at the bottom of this page.) Or call your family doctor.     If you have wheezing or trouble breathing, call 911.    Self-care  -Drink at least 4 extra glasses of water today.   This reduces the stress on your kidneys.  -Keep taking your regular medicines.    The contrast will pass out of your body in your  Urine(pee). This will happen in the next 24 hours. You  will not feel this. Your urine will not  change color.    If you have kidney problems or take metformin    Drink 4 to 8 large glasses of water for the next  2 days, if you are not on a fluid restriction.    ?If you take metformin (Glucophage or Glucovance) for diabetes, keep taking it.      ?Your kidney function tests are abnormal.  If you take Metformin, do not take it for 48 hours. Please go to your clinic for a blood test within 3 days after your exam before the restarting this medicine.     (Note to provider:please give patient prescription for lab tests.)    ?Special instructions:     I have read and understand the above information.    Patient Sign  Here:______________________________________Date:________Time:______    Staff Sign Here:________________________________________Date:_______Time:______      Radiology Departments:     ?[facility]: [phone] ?[facility]: [phone]     ?[facility]: [phone] ?[place]:[phone]      ?[place]: [phone]  ?[place]: [phone]  ?[place]:[phone]    ?[facility] [place]:[phone]  ?[facility] [place]:[phone]

## 2022-03-23 NOTE — PROGRESS NOTES
4:47am-Received on call page for home oxygenam at 4:08. Reviewed chart, all documentation needed. Called nurse, Urmila back to let her know. Called  to deliver and cannot get ahold of him. Waiting for a call back from . Called Urmila to let her know that I'd call her back once I hear from him and will update with ETA.  Received confirmation via text from  minutes after speaking with Urmila. Will deliver to bedside within the next few hours

## 2022-03-23 NOTE — ED TRIAGE NOTES
"Pt states she started feeling SOB today. States cough also started today. Felt the chills yesterday. Tried to lay down tonight \"and I couldn't-I was too SOB\".  "

## 2022-03-23 NOTE — ED PROVIDER NOTES
History     Chief Complaint   Patient presents with     Shortness of Breath     HPI  Rita Stovall is a 61 year old female who is here w/ cough and sob. 2 days. Constant. Mild phlegm.     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     Priority: Medium     Added automatically from request for surgery [id number]       Muscle weakness 07/20/2020     Priority: Medium     Chronic bilateral low back pain with right-sided sciatica 07/11/2016     Priority: Medium     Strain of sacroiliac ligament, initial encounter 07/11/2016     Priority: Medium     Lumbago 02/01/2012     Priority: Medium     Overview:   IMO Update 10/11       Cervicalgia 01/16/2012     Priority: Medium     Overview:   IMO Update 10/11       Strain of thoracic spine 01/16/2012     Priority: Medium     Anxiety state 12/20/2010     Priority: Medium     Overview:   IMO Update 10/11       Premenstrual tension syndrome 10/27/2010     Priority: Medium     Overview:   IMO Update 10/11          Past Medical History:    No past medical history on file.    Past Surgical History:    Past Surgical History:   Procedure Laterality Date     COLONOSCOPY  7/26/2013    Procedure: COLONOSCOPY;  COLONOSCOPY WITH Polypectomy;  Surgeon: Sergei Andrade MD;  Location: [facility]     ENDOSCOPIC SINUS SURGERY N/A 6/23/2021    Procedure: Bilateral endoscopic sinus surgery;  Surgeon: Blanca Vaughn MD;  Location: [facility]     ENT SURGERY       TURBINOPLASTY Bilateral 6/23/2021    Procedure: Turbinate Reduction, Excision hyacinth bullosa;  Surgeon: Blanca Vaughn MD;  Location: [facility]       Family History:    No family history on file.    Social  History:  Marital Status:   [2]  Social History     Tobacco Use     Smoking status: Never Smoker     Smokeless tobacco: Never Used   Substance Use Topics     Alcohol use: Yes     Comment: social        Medications:    budesonide (PULMICORT) 0.5 MG/2ML neb solution  estradiol (ESTRACE) 0.1 MG/GM vaginal cream  fluticasone (FLONASE) 50 MCG/ACT nasal spray  predniSONE (DELTASONE) 20 MG tablet  VITAMIN D, CHOLECALCIFEROL, PO          Review of Systems   Constitutional: Negative for chills and fever.   Respiratory: Positive for cough and shortness of breath.    All other systems reviewed and are negative.      Physical Exam   BP: (!) 145/105  Pulse: 91  Temp: 98.8  F (37.1  C)  Resp: 20  SpO2: (!) 90 %      Physical Exam  Constitutional:       General: She is not in acute distress.     Appearance: She is not diaphoretic.   HENT:      Head: Normocephalic and atraumatic.      Right Ear: External ear normal.      Left Ear: External ear normal.      Nose: No congestion or rhinorrhea.      Mouth/Throat:      Pharynx: Oropharynx is clear. No oropharyngeal exudate.   Eyes:      General: No scleral icterus.     Pupils: Pupils are equal, round, and reactive to light.   Cardiovascular:      Rate and Rhythm: Normal rate and regular rhythm.      Heart sounds: Normal heart sounds.   Pulmonary:      Effort: Pulmonary effort is normal. No tachypnea, accessory muscle usage or respiratory distress.      Breath sounds: Wheezing present. No rhonchi or rales.   Abdominal:      General: Bowel sounds are normal.      Palpations: Abdomen is soft.      Tenderness: There is no abdominal tenderness.   Musculoskeletal:         General: No tenderness.      Cervical back: Normal range of motion and neck supple.      Right lower leg: No edema.      Left lower leg: No edema.   Skin:     General: Skin is warm.      Capillary Refill: Capillary refill takes less than 2 seconds.      Findings: No rash.   Neurological:      Mental Status: Mental  status is at baseline.      Cranial Nerves: No cranial nerve deficit.   Psychiatric:         Mood and Affect: Mood normal.         Behavior: Behavior normal.         ED Course                 Procedures    Results for orders placed during the hospital encounter of 03/22/22    POC US ECHO LIMITED    Impression  [facility] Procedure Note    Limited Bedside ED Cardiac Ultrasound:    PROCEDURE: PERFORMED BY: Dr. Gera Lowery MD  INDICATIONS/SYMPTOM:  Shortness of Breath  PROBE: Cardiac phased array probe  BODY LOCATION: Chest  FINDINGS:  The ultrasound was performed utilizing the subcostal, parasternal long axis, parasternal short axis and apical 4 chamber views.  Cardiac contractility:  Present  Gross estimation of cardiac kinesis: normal  Pericardial Effusion:  None  RV:LV ratio: Equal  INTERPRETATION:    Chamber size and motion were grossly normal with LV > RV, normal cardiac kinesis.  No pericardial effusion was found.  IVC visualized and findings indicate normovolemia.  IMAGE DOCUMENTATION: Images were archived to PACs system.           Critical Care time:               No results found for this or any previous visit (from the past 24 hour(s)).    Medications   ipratropium - albuterol 0.5 mg/2.5 mg/3 mL (DUONEB) neb solution 3 mL (3 mLs Nebulization Given 3/22/22 2231)   dexamethasone (DECADRON) injectable solution used ORALLY 10 mg (10 mg Oral Given 3/22/22 2346)   ipratropium - albuterol 0.5 mg/2.5 mg/3 mL (DUONEB) neb solution 3 mL (3 mLs Nebulization Given 3/22/22 2349)   iopamidol (ISOVUE-370) solution 55 mL (55 mLs Intravenous Given 3/23/22 0219)   sodium chloride (PF) 0.9% PF flush 100 mL (100 mLs Intravenous Given 3/23/22 0219)   ipratropium - albuterol 0.5 mg/2.5 mg/3 mL (DUONEB) neb solution 3 mL (3 mLs Nebulization Given 3/23/22 0408)   azithromycin (ZITHROMAX) tablet 500 mg (500 mg Oral Given 3/23/22 0427)       Assessments & Plan (with Medical Decision Making)     I have reviewed the nursing  notes.    I have reviewed the findings, diagnosis, plan and need for follow up with the patient.  Oxygen Documentation:   I certify that this patient, Rita Stovall has been under my care (or a nurse practitioner or physican's assistant working with me). This is the face-to-face encounter for oxygen medical necessity.      Rita Stovall is now in a chronic stable state and continues to require supplemental oxygen. Patient has continued oxygen desaturation due to Mild Persistent Asthma J45.30.    Alternative treatment(s) tried or considered and deemed clinically infective for treatment of Mild Persistent Asthma J45.30 include nebulizers, inhalers and steroids.  If portability is ordered, is the patient mobile within the home? yes    **Patients who qualify for home O2 coverage under the CMS guidelines require ABG tests or O2 sat readings obtained closest to, but no earlier than 2 days prior to the discharge, as evidence of the need for home oxygen therapy. Testing must be performed while patient is in the chronic stable state. See notes for O2 sats.**                    Discharge Medication List as of 3/23/2022  6:31 AM      START taking these medications    Details   azithromycin (ZITHROMAX Z-DAKOTA) 250 MG tablet Take 1 tablet (250 mg) by mouth daily for 4 days Two tablets on the first day, then one tablet daily for the next 4 days, Disp-4 tablet, R-0, E-Prescribe      predniSONE (DELTASONE) 20 MG tablet Take two tablets (= 40mg) each day for 5 (five) days, Disp-10 tablet, R-0, E-Prescribe             Final diagnoses:   Bronchitis       3/22/2022   [facility]     Gera Lowery MD  03/23/22 [id number]       Gera Lowery MD  04/16/22 1952

## 2023-02-14 ENCOUNTER — TRANSFERRED RECORDS (OUTPATIENT)
Dept: HEALTH INFORMATION MANAGEMENT | Facility: CLINIC | Age: 63
End: 2023-02-14

## 2023-02-14 ENCOUNTER — MEDICAL CORRESPONDENCE (OUTPATIENT)
Dept: HEALTH INFORMATION MANAGEMENT | Facility: HOSPITAL | Age: 63
End: 2023-02-14

## 2023-03-01 ENCOUNTER — OFFICE VISIT (OUTPATIENT)
Dept: OTOLARYNGOLOGY | Facility: OTHER | Age: 63
End: 2023-03-01
Attending: PHYSICIAN ASSISTANT
Payer: COMMERCIAL

## 2023-03-01 ENCOUNTER — ANCILLARY PROCEDURE (OUTPATIENT)
Dept: GENERAL RADIOLOGY | Facility: OTHER | Age: 63
End: 2023-03-01
Attending: PHYSICIAN ASSISTANT
Payer: COMMERCIAL

## 2023-03-01 VITALS
BODY MASS INDEX: 28.35 KG/M2 | HEART RATE: 92 BPM | WEIGHT: 160 LBS | SYSTOLIC BLOOD PRESSURE: 124 MMHG | DIASTOLIC BLOOD PRESSURE: 80 MMHG | HEIGHT: 63 IN | OXYGEN SATURATION: 93 % | TEMPERATURE: 96.9 F

## 2023-03-01 DIAGNOSIS — R06.09 DYSPNEA ON EXERTION: ICD-10-CM

## 2023-03-01 DIAGNOSIS — R09.82 POST-NASAL DRAINAGE: ICD-10-CM

## 2023-03-01 DIAGNOSIS — Z98.890 S/P FESS (FUNCTIONAL ENDOSCOPIC SINUS SURGERY): ICD-10-CM

## 2023-03-01 DIAGNOSIS — R06.2 WHEEZE: ICD-10-CM

## 2023-03-01 DIAGNOSIS — J32.4 CHRONIC PANSINUSITIS: ICD-10-CM

## 2023-03-01 DIAGNOSIS — R05.3 CHRONIC COUGH: Primary | ICD-10-CM

## 2023-03-01 PROCEDURE — 99214 OFFICE O/P EST MOD 30 MIN: CPT | Mod: 25 | Performed by: PHYSICIAN ASSISTANT

## 2023-03-01 PROCEDURE — 71046 X-RAY EXAM CHEST 2 VIEWS: CPT | Mod: TC | Performed by: RADIOLOGY

## 2023-03-01 PROCEDURE — 31575 DIAGNOSTIC LARYNGOSCOPY: CPT | Performed by: PHYSICIAN ASSISTANT

## 2023-03-01 RX ORDER — BUDESONIDE 0.5 MG/2ML
INHALANT ORAL
Qty: 120 ML | Refills: 1 | Status: SHIPPED | OUTPATIENT
Start: 2023-03-01

## 2023-03-01 ASSESSMENT — PAIN SCALES - GENERAL: PAINLEVEL: NO PAIN (0)

## 2023-03-01 NOTE — PROGRESS NOTES
Patient returns to ENT for concerns regarding rhinitis and postnasal drainage.  Patient was last seen in ENT on 7/22/2021 by Dr. Vaguhn following routine postoperative FESS.   Patient presents for continued concerns of post nasal drainage, cough. She has ongoing concerns with breathing, wheezing worsening   Symptoms developed about 1 year ago and have been worsening.   Rita reports she is dyspnea worse with exertion. Occurs with acitivyt- lifting/ shoveling.   She has concerns about sleeping at night.   She has constant throat clearing.   She has symptoms worsening after eating.     +PND  +Chest wheezing  +GLobus sensation  +Frequent throat clearing    Patient denies sore throat or throat pain  Denies chronic otalgia.   Denies fevers, night sweats or weight loss.   Denies dysphagia or dysphonia.   +globus sensation.     Water- adequate  Caffeine- 1 cup   ETOH- Social  Tobacco- Never  Reflux- None.   Mild frontal pressure.     She has been UC twice and PCP.   She has been on inhalers, nasal sprays without relief.   Flonase, Astelin, Prednisone, albuterol inhaler without relief.       Procedures on 6/23/2021  Procedures:    1.  Bilateral total ethmoidectomy with polypectomy  2.  Bilateral max antrostomy with tissue removal  3.  Right sphenoidotomy  4.  Bilateral excision hyacinth bullosa  5.  Bilateral submucosal reduction inferior turbinates     Sinus procedures performed with [employer] navigation  Surgeon:  Blanca Vaughn D.O.  Anesthesia:  General endotracheal  EBL:  15 ml  Findings: small polyps and polypoid degeneration of the anterior and posterior ethmoid cavity.     Bilateral sinus contents:   - Inflammatory polyp   - Fragments of sinonasal mucosa with chronic inflammation   - Eosinophils focally number up to 40 per hpf         She denies a seasonal component to her symptoms and there is no  known history of significant inhalant or ASA allergies.  No known family history of allergic rhinitis  "with maternal aunt had nasal polyps    No past medical history on file.   No Known Allergies  Current Outpatient Medications   Medication     budesonide (PULMICORT) 0.5 MG/2ML neb solution     estradiol (ESTRACE) 0.1 MG/GM vaginal cream     fluticasone (FLONASE) 50 MCG/ACT nasal spray     predniSONE (DELTASONE) 20 MG tablet     VITAMIN D, CHOLECALCIFEROL, PO     No current facility-administered medications for this visit.     ROS- SEE HPI  /80 (BP Location: Right arm, Cuff Size: Adult Regular)   Pulse 92   Temp 96.9  F (36.1  C) (Tympanic)   Ht 1.6 m (5' 3\")   Wt 72.6 kg (160 lb)   SpO2 93%   BMI 28.34 kg/m      General - The patient is well nourished and well developed, and appears to have good nutritional status.  Alert and oriented to person and place, interactive. Coughing throughout visit.   Head and Face - Normocephalic and atraumatic, with no gross asymmetry noted of the contour of the facial features.  The facial nerve is intact, with strong symmetric movements.  Neck-no palpable lymphadenopathy or thyroid mass.  Trachea is midline.  Eyes - Extraocular movements intact.   Ears- External auditory canals are patent, tympanic membranes are intact without effusion or worrisome retractions   Nose - Nasal mucosa is pink and moist with no abnormal mucus.  The septum was grossly midline and non-obstructive, turbinates of normal size and position.  No polyps, masses, or purulence noted on examination.  Mouth - Examination of the oral cavity shows pink, healthy, moist mucosa.  No lesions or ulceration noted.  The dentition are in good repair.  The tongue is mobile and midline.  Throat - The walls of the oropharynx were smooth, pink, moist, symmetric, and had no lesions or ulcerations.  The tonsillar pillars and soft palate were symmetric.  The uvula was midline on elevation.    Heart- Regular rate  Lungs- Clear bases, scant wheezing along upper.   Flexible Endoscopy -     Attempts at mirror laryngoscopy " were not possible due to gag reflex.  Therefore I proceeded with a fiberoptic examination.  First I sprayed both sides of the nose with a mixture of lidocaine and neosynephrine.  I then passed the scope through the nasal cavity. The nasal cavity was unremarkable. Surgical changes. No evidence of active sinusitis. There was mild post nasal rhinitis/ drips along NP.   The nasopharynx was mucosally covered and symmetric.  The Eustachian tube openings were unobstructed.  Going further down I had a clear view of the base of tongue which had normal appearing lingual tonsillar tissue.  The base of tongue was free of lesions, and the vallecula was open.  The epiglottis was smooth and mucosally covered.  The supraglottic larynx was then clearly visualized.  The vocal cords moved smoothly and symmetrically, they were pearly white and no lesions were seen.  The pyriform sinuses were open, and the limited view of the postcricoid region did not show any lesions.      ASSESSMENT/ PLAN:    ICD-10-CM    1. Chronic cough  R05.3 mometasone-formoterol (DULERA) 200-5 MCG/ACT inhaler     budesonide (PULMICORT) 0.5 MG/2ML neb solution     General PFT Lab (Please always keep checked)     Pulmonary Function Test     XR Chest 2 Views     Adult Pulmonary Medicine Referral     General PFT Lab (Please always keep checked)      2. Wheeze  R06.2 mometasone-formoterol (DULERA) 200-5 MCG/ACT inhaler     budesonide (PULMICORT) 0.5 MG/2ML neb solution     General PFT Lab (Please always keep checked)     Pulmonary Function Test     XR Chest 2 Views     Adult Pulmonary Medicine Referral     General PFT Lab (Please always keep checked)      3. Dyspnea on exertion  R06.09 General PFT Lab (Please always keep checked)     Pulmonary Function Test     Adult Pulmonary Medicine Referral     General PFT Lab (Please always keep checked)      4. Post-nasal drainage  R09.82 General PFT Lab (Please always keep checked)     Pulmonary Function Test     Adult  Pulmonary Medicine Referral     General PFT Lab (Please always keep checked)      5. S/P FESS (functional endoscopic sinus surgery)  Z98.890       6. Chronic pansinusitis  J32.4             Her sinuses overall appear stable, no active sinusitis on exam. She does have mild rhinitis/PND on examination. Recommended her to start Budesonide rinses BID.   CXR completed which was stable. No evidence for acute changes.     PFT was requested and pulmonology referral placed.     Start a trial of Dulera as directed  Reviewed use of daily inhaler/ albuterol inhaler.     Follow up in 4-6 weeks.  Briefly reviewed possible chronic cough and further treatment options and imaging. However, she has significant cough/ dyspnea/ wheeze. Recommended PFT and then proceed with further assessment.   Patient was happy with this plan.       Budesonide nasal saline irrigation per instructions:  -Obtain [employer] [facility] Sinus rinse over the counter.    -Use warm distilled water and 2 packets of the salt solution that comes with the bottle, dissolve in bottle up to the 240 mL gabriella.  -Add 1 vial of budesonide.  -Irrigate each side of your nose leaning over the sink, using 1/3 to 1/2 the volume of the bottle in each nostril every irrigation.  Irrigate 2 times daily.  -If additional rinses are needed/recommended, you may use the plan [employer] [facility] Sinus irrigation without the use of added budesonide      Dayna Davies PA-C  ENT  [facility], [place]

## 2023-03-01 NOTE — LETTER
3/1/2023         RE: Rita Stovall  [address]  [place] [address]        Dear Colleague,    Thank you for referring your patient, Rita Stovall, to the [facility] [place]. Please see a copy of my visit note below.        Patient returns to ENT for concerns regarding rhinitis and postnasal drainage.  Patient was last seen in ENT on 7/22/2021 by Dr. Vaughn following routine postoperative FESS.   Patient presents for continued concerns of post nasal drainage, cough. She has ongoing concerns with breathing, wheezing worsening   Symptoms developed about 1 year ago and have been worsening.   Rita reports she is dyspnea worse with exertion. Occurs with acitivyt- lifting/ shoveling.   She has concerns about sleeping at night.   She has constant throat clearing.   She has symptoms worsening after eating.     +PND  +Chest wheezing  +GLobus sensation  +Frequent throat clearing    Patient denies sore throat or throat pain  Denies chronic otalgia.   Denies fevers, night sweats or weight loss.   Denies dysphagia or dysphonia.   +globus sensation.     Water- adequate  Caffeine- 1 cup   ETOH- Social  Tobacco- Never  Reflux- None.   Mild frontal pressure.     She has been UC twice and PCP.   She has been on inhalers, nasal sprays without relief.   Flonase, Astelin, Prednisone, albuterol inhaler without relief.       Procedures on 6/23/2021  Procedures:    1.  Bilateral total ethmoidectomy with polypectomy  2.  Bilateral max antrostomy with tissue removal  3.  Right sphenoidotomy  4.  Bilateral excision hyacinth bullosa  5.  Bilateral submucosal reduction inferior turbinates     Sinus procedures performed with [employer] navigation  Surgeon:  TRINITY Waterman.DEONTE.  Anesthesia:  General endotracheal  EBL:  15 ml  Findings: small polyps and polypoid degeneration of the anterior and posterior ethmoid cavity.     Bilateral sinus contents:   - Inflammatory polyp   - Fragments of sinonasal mucosa  "with chronic inflammation   - Eosinophils focally number up to 40 per hpf         She denies a seasonal component to her symptoms and there is no  known history of significant inhalant or ASA allergies.  No known family history of allergic rhinitis with maternal aunt had nasal polyps    No past medical history on file.   No Known Allergies  Current Outpatient Medications   Medication     budesonide (PULMICORT) 0.5 MG/2ML neb solution     estradiol (ESTRACE) 0.1 MG/GM vaginal cream     fluticasone (FLONASE) 50 MCG/ACT nasal spray     predniSONE (DELTASONE) 20 MG tablet     VITAMIN D, CHOLECALCIFEROL, PO     No current facility-administered medications for this visit.     ROS- SEE HPI  /80 (BP Location: Right arm, Cuff Size: Adult Regular)   Pulse 92   Temp 96.9  F (36.1  C) (Tympanic)   Ht 1.6 m (5' 3\")   Wt 72.6 kg (160 lb)   SpO2 93%   BMI 28.34 kg/m      General - The patient is well nourished and well developed, and appears to have good nutritional status.  Alert and oriented to person and place, interactive. Coughing throughout visit.   Head and Face - Normocephalic and atraumatic, with no gross asymmetry noted of the contour of the facial features.  The facial nerve is intact, with strong symmetric movements.  Neck-no palpable lymphadenopathy or thyroid mass.  Trachea is midline.  Eyes - Extraocular movements intact.   Ears- External auditory canals are patent, tympanic membranes are intact without effusion or worrisome retractions   Nose - Nasal mucosa is pink and moist with no abnormal mucus.  The septum was grossly midline and non-obstructive, turbinates of normal size and position.  No polyps, masses, or purulence noted on examination.  Mouth - Examination of the oral cavity shows pink, healthy, moist mucosa.  No lesions or ulceration noted.  The dentition are in good repair.  The tongue is mobile and midline.  Throat - The walls of the oropharynx were smooth, pink, moist, symmetric, and had no " lesions or ulcerations.  The tonsillar pillars and soft palate were symmetric.  The uvula was midline on elevation.    Heart- Regular rate  Lungs- Clear bases, scant wheezing along upper.   Flexible Endoscopy -     Attempts at mirror laryngoscopy were not possible due to gag reflex.  Therefore I proceeded with a fiberoptic examination.  First I sprayed both sides of the nose with a mixture of lidocaine and neosynephrine.  I then passed the scope through the nasal cavity. The nasal cavity was unremarkable. Surgical changes. No evidence of active sinusitis. There was mild post nasal rhinitis/ drips along NP.   The nasopharynx was mucosally covered and symmetric.  The Eustachian tube openings were unobstructed.  Going further down I had a clear view of the base of tongue which had normal appearing lingual tonsillar tissue.  The base of tongue was free of lesions, and the vallecula was open.  The epiglottis was smooth and mucosally covered.  The supraglottic larynx was then clearly visualized.  The vocal cords moved smoothly and symmetrically, they were pearly white and no lesions were seen.  The pyriform sinuses were open, and the limited view of the postcricoid region did not show any lesions.      ASSESSMENT/ PLAN:    ICD-10-CM    1. Chronic cough  R05.3 mometasone-formoterol (DULERA) 200-5 MCG/ACT inhaler     budesonide (PULMICORT) 0.5 MG/2ML neb solution     General PFT Lab (Please always keep checked)     Pulmonary Function Test     XR Chest 2 Views     Adult Pulmonary Medicine Referral     General PFT Lab (Please always keep checked)      2. Wheeze  R06.2 mometasone-formoterol (DULERA) 200-5 MCG/ACT inhaler     budesonide (PULMICORT) 0.5 MG/2ML neb solution     General PFT Lab (Please always keep checked)     Pulmonary Function Test     XR Chest 2 Views     Adult Pulmonary Medicine Referral     General PFT Lab (Please always keep checked)      3. Dyspnea on exertion  R06.09 General PFT Lab (Please always keep  checked)     Pulmonary Function Test     Adult Pulmonary Medicine Referral     General PFT Lab (Please always keep checked)      4. Post-nasal drainage  R09.82 General PFT Lab (Please always keep checked)     Pulmonary Function Test     Adult Pulmonary Medicine Referral     General PFT Lab (Please always keep checked)      5. S/P FESS (functional endoscopic sinus surgery)  Z98.890       6. Chronic pansinusitis  J32.4             Her sinuses overall appear stable, no active sinusitis on exam. She does have mild rhinitis/PND on examination. Recommended her to start Budesonide rinses BID.   CXR completed which was stable. No evidence for acute changes.     PFT was requested and pulmonology referral placed.     Start a trial of Dulera as directed  Reviewed use of daily inhaler/ albuterol inhaler.     Follow up in 4-6 weeks.  Briefly reviewed possible chronic cough and further treatment options and imaging. However, she has significant cough/ dyspnea/ wheeze. Recommended PFT and then proceed with further assessment.   Patient was happy with this plan.       Budesonide nasal saline irrigation per instructions:  -Obtain [employer] Sinus rinse over the counter.    -Use warm distilled water and 2 packets of the salt solution that comes with the bottle, dissolve in bottle up to the 240 mL gabriella.  -Add 1 vial of budesonide.  -Irrigate each side of your nose leaning over the sink, using 1/3 to 1/2 the volume of the bottle in each nostril every irrigation.  Irrigate 2 times daily.  -If additional rinses are needed/recommended, you may use the plan [employer] [facility] Sinus irrigation without the use of added budesonide      Dayna Davies PA-C  ENT  [facility], [place]            Again, thank you for allowing me to participate in the care of your patient.        Sincerely,        Dayna Davies PA-C

## 2023-03-01 NOTE — PATIENT INSTRUCTIONS
Complete Chest X ray, pulmonary function testing.   Start Budesonide rinses. Rinse 1-2 times daily.   Start Dulera inhaler 2 puffs twice a day.   Albuterol inhaler as needed.     Referral to pulmonology.   Follow up in 4-6 weeks.     Thank you for allowing Dayna Davies PA-C and our ENT team to participate in your care.  If your medications are too expensive, please give the nurse a call.  We can possibly change this medication.  If you have a scheduling or an appointment question please contact our Health Unit Coordinator at [phone], Ext. [phone].    ALL nursing questions or concerns can be directed to your ENT nurse at: [phone] [facility]      Budesonide nasal saline irrigation per instructions:  -Obtain [employer] [facility] Sinus rinse over the counter.    -Use warm distilled water and 2 packets of the salt solution that comes with the bottle, dissolve in bottle up to the 240 mL gabriella.  -Add 1 vial of budesonide.  -Irrigate each side of your nose leaning over the sink, using 1/3 to 1/2 the volume of the bottle in each nostril every irrigation.  Irrigate 2 times daily.  -If additional rinses are needed/recommended, you may use the plan [employer] [facility] Sinus irrigation without the use of added budesonide

## 2023-03-09 ENCOUNTER — NURSE TRIAGE (OUTPATIENT)
Dept: OTOLARYNGOLOGY | Facility: OTHER | Age: 63
End: 2023-03-09

## 2023-03-09 DIAGNOSIS — J32.4 CHRONIC PANSINUSITIS: Primary | ICD-10-CM

## 2023-03-09 RX ORDER — CEFDINIR 300 MG/1
300 CAPSULE ORAL 2 TIMES DAILY
Qty: 20 CAPSULE | Refills: 0 | Status: SHIPPED | OUTPATIENT
Start: 2023-03-09

## 2023-03-09 NOTE — TELEPHONE ENCOUNTER
Pt calling and is requesting an ABX for sinus infection. She states she was just seen by ENT. Seen Jaycee on 3.1.2023.    After she started the nasal rinses her nasal drainage has turned green.Nasal congestion.Pressure and pain in forehead and face. Has not checked temp but has had chills off and on.    Please advise.    Advised if s/s worsen go to .     Call pt back at [phone]      Merary Blakely RN    Reason for Disposition    [1] Using nasal washes and pain medicine > 24 hours AND [2] sinus pain (around cheekbone or eye) persists    [1] Sinus congestion (pressure, fullness) AND [2] present > 10 days    Additional Information    Negative: [1] Difficulty breathing AND [2] not from stuffy nose (e.g., not relieved by cleaning out the nose)    Negative: [1] Sinus infection AND [2] taking an antibiotic AND [3] symptoms continue    Negative: [1] SEVERE headache AND [2] fever    Negative: [1] Redness or swelling on the cheek, forehead or around the eye AND [2] fever    Negative: Fever > 104 F (40 C)    Negative: Patient sounds very sick or weak to the triager    Negative: [1] SEVERE pain AND [2] not improved 2 hours after pain medicine    Negative: [1] Redness or swelling on the cheek, forehead or around the eye AND [2] no fever    Negative: [1] Fever > 101 F (38.3 C) AND [2] age > 60 years    Negative: [1] Fever > 100.0 F (37.8 C) AND [2] bedridden (e.g., nursing home patient, CVA, chronic illness, recovering from surgery)    Negative: [1] Fever > 100.0 F (37.8 C) AND [2] diabetes mellitus or weak immune system (e.g., HIV positive, cancer chemo, splenectomy, organ transplant, chronic steroids)    Negative: Fever present > 3 days (72 hours)    Negative: [1] Fever returns after gone for over 24 hours AND [2] symptoms worse or not improved    Negative: [1] Sinus pain (not just congestion) AND [2] fever    Negative: Earache    Negative: [1] Nasal discharge AND [2] present > 10 days    Answer Assessment - Initial  "Assessment Questions  1. LOCATION: \"Where does it hurt?\"       Forehead and face  2. ONSET: \"When did the sinus pain start?\"  (e.g., hours, days)       Sunday  3. SEVERITY: \"How bad is the pain?\"   (Scale 1-10; mild, moderate or severe)    - MILD (1-3): doesn't interfere with normal activities     - MODERATE (4-7): interferes with normal activities (e.g., work or school) or awakens from sleep    - SEVERE (8-10): excruciating pain and patient unable to do any normal activities         3  4. RECURRENT SYMPTOM: \"Have you ever had sinus problems before?\" If Yes, ask: \"When was the last time?\" and \"What happened that time?\"       ys  5. NASAL CONGESTION: \"Is the nose blocked?\" If Yes, ask: \"Can you open it or must you breathe through your mouth?\"      Green discharge and nasal congestion, little blocked nose -she is using rinses  6. NASAL DISCHARGE: \"Do you have discharge from your nose?\" If so ask, \"What color?\"      See above  7. FEVER: \"Do you have a fever?\" If Yes, ask: \"What is it, how was it measured, and when did it start?\"       On and off chills, has not checked  8. OTHER SYMPTOMS: \"Do you have any other symptoms?\" (e.g., sore throat, cough, earache, difficulty breathing)      no  9. PREGNANCY: \"Is there any chance you are pregnant?\" \"When was your last menstrual period?\"      na    Protocols used: SINUS PAIN OR CONGESTION-A-AH      "

## 2023-04-25 ENCOUNTER — HOSPITAL ENCOUNTER (OUTPATIENT)
Dept: RESPIRATORY THERAPY | Facility: HOSPITAL | Age: 63
Discharge: HOME | End: 2023-04-25
Attending: PHYSICIAN ASSISTANT | Admitting: INTERNAL MEDICINE
Payer: COMMERCIAL

## 2023-04-25 DIAGNOSIS — R09.82 POST-NASAL DRAINAGE: ICD-10-CM

## 2023-04-25 DIAGNOSIS — R06.2 WHEEZE: ICD-10-CM

## 2023-04-25 DIAGNOSIS — R05.3 CHRONIC COUGH: ICD-10-CM

## 2023-04-25 DIAGNOSIS — R06.09 DYSPNEA ON EXERTION: ICD-10-CM

## 2023-04-25 LAB — HGB BLD-MCNC: 13.9 G/DL (ref 11.7–15.7)

## 2023-04-25 PROCEDURE — 94010 BREATHING CAPACITY TEST: CPT | Mod: 26 | Performed by: INTERNAL MEDICINE

## 2023-04-25 PROCEDURE — 94010 BREATHING CAPACITY TEST: CPT

## 2023-04-25 PROCEDURE — 94726 PLETHYSMOGRAPHY LUNG VOLUMES: CPT | Mod: 26 | Performed by: INTERNAL MEDICINE

## 2023-04-25 PROCEDURE — 94729 DIFFUSING CAPACITY: CPT

## 2023-04-25 PROCEDURE — 94726 PLETHYSMOGRAPHY LUNG VOLUMES: CPT

## 2023-04-25 PROCEDURE — 85018 HEMOGLOBIN: CPT | Performed by: PHYSICIAN ASSISTANT

## 2023-04-25 PROCEDURE — 36415 COLL VENOUS BLD VENIPUNCTURE: CPT | Performed by: PHYSICIAN ASSISTANT

## 2023-04-25 PROCEDURE — 94729 DIFFUSING CAPACITY: CPT | Mod: 26 | Performed by: INTERNAL MEDICINE

## 2023-05-02 ENCOUNTER — TRANSFERRED RECORDS (OUTPATIENT)
Dept: HEALTH INFORMATION MANAGEMENT | Facility: CLINIC | Age: 63
End: 2023-05-02

## 2024-08-16 ENCOUNTER — TRANSFERRED RECORDS (OUTPATIENT)
Dept: HEALTH INFORMATION MANAGEMENT | Facility: CLINIC | Age: 64
End: 2024-08-16

## 2024-09-25 ENCOUNTER — OFFICE VISIT (OUTPATIENT)
Dept: OTOLARYNGOLOGY | Facility: OTHER | Age: 64
End: 2024-09-25
Attending: NURSE PRACTITIONER
Payer: COMMERCIAL

## 2024-09-25 VITALS
TEMPERATURE: 96.5 F | HEART RATE: 81 BPM | SYSTOLIC BLOOD PRESSURE: 115 MMHG | RESPIRATION RATE: 16 BRPM | WEIGHT: 165 LBS | DIASTOLIC BLOOD PRESSURE: 85 MMHG | BODY MASS INDEX: 29.23 KG/M2 | OXYGEN SATURATION: 96 % | HEIGHT: 63 IN

## 2024-09-25 DIAGNOSIS — R09.82 POST-NASAL DRAINAGE: Primary | ICD-10-CM

## 2024-09-25 DIAGNOSIS — J01.00 ACUTE MAXILLARY SINUSITIS, RECURRENCE NOT SPECIFIED: ICD-10-CM

## 2024-09-25 DIAGNOSIS — R05.3 CHRONIC COUGH: ICD-10-CM

## 2024-09-25 DIAGNOSIS — J30.2 SEASONAL ALLERGIC RHINITIS, UNSPECIFIED TRIGGER: ICD-10-CM

## 2024-09-25 DIAGNOSIS — J33.9 NASAL POLYP: ICD-10-CM

## 2024-09-25 RX ORDER — BUDESONIDE 0.5 MG/2ML
INHALANT ORAL
Qty: 120 ML | Refills: 4 | Status: SHIPPED | OUTPATIENT
Start: 2024-09-25

## 2024-09-25 RX ORDER — CETIRIZINE HYDROCHLORIDE 10 MG/1
10 TABLET ORAL DAILY
Qty: 30 TABLET | Refills: 11 | Status: SHIPPED | OUTPATIENT
Start: 2024-09-25

## 2024-09-25 RX ORDER — CEFDINIR 300 MG/1
300 CAPSULE ORAL 2 TIMES DAILY
Qty: 14 CAPSULE | Refills: 0 | Status: SHIPPED | OUTPATIENT
Start: 2024-09-25 | End: 2024-10-02

## 2024-09-25 ASSESSMENT — PAIN SCALES - GENERAL: PAINLEVEL: NO PAIN (1)

## 2024-09-25 NOTE — PROGRESS NOTES
Otolaryngology Note         Chief Complaint:     Patient presents with:  Cough: Referred by Dr. Garcia from [facility]   Asthma  Allergies: Test for allergies            History of Present Illness:     Rita Stovall is a 64 year old female seen today for concerns for cough.      She reports clear drainage that she can feel draining down the throat.  This causes cough.  Seems to be worse in fall months.    She reports something similar happened this time of year last year.     She saw her Dr Garcia, pulmonologist and requested allergy testing  She uses rinses and nasal sprays with some improvement, but is questionable how much  She has dulera inhaler that she only uses as needed.  Used it more with humidity and heat.      She has had some recent wheezing in her chest, also has nasal congestion and it makes noise with breathing at times.    History of sinus surgery x 2, last was June 2021:  Otolaryngology Operative Note      Pre-op Diagnosis: Chronic pansinusitis, bilateral hyacinth bullosa, bilateral inferior turbinate hypertrophy  Post-op Diagnosis:  same  Procedures:    1.  Bilateral total ethmoidectomy with polypectomy  2.  Bilateral max antrostomy with tissue removal  3.  Right sphenoidotomy  4.  Bilateral excision hyacinth bullosa  5.  Bilateral submucosal reduction inferior turbinates     Sinus procedures performed with [employer] navigation  Surgeon:  Blanca Vaughn D.O.  Anesthesia:  General endotracheal  EBL:  15 ml  Findings: small polyps and polypoid degeneration of the anterior and posterior ethmoid cavity.  Complications:  none  Condition:  stable     She has intermittent facial pain and pressure, recently started taking OTC sinus pills with improvement  She does not currently take claritin or zyrtec.      No recent fevers or chills, appetite and energy level have been good.    Only feeling shortness of breath with high humidity    She works at [employer] as   no hemoptysis  No  pharyngitis  No dysphagia  No weight loss  No otalgia         Medications:     Current Outpatient Rx   Medication Sig Dispense Refill    budesonide (PULMICORT) 0.5 MG/2ML neb solution Mix 240 ml [facility] Sinus rinse, add 0.5 mg budesonide vial, rinse 1/2 bottle in each nostril twice daily 120 mL 4    budesonide (PULMICORT) 0.5 MG/2ML neb solution Make 240 cc [facility] sinus irrigation Mix 2 ml vial of budesonide 0.5 mg Rinse BID for 2 weeks 120 mL 1    cefdinir (OMNICEF) 300 MG capsule Take 1 capsule (300 mg) by mouth 2 times daily for 7 days. 14 capsule 0    cetirizine (ZYRTEC) 10 MG tablet Take 1 tablet (10 mg) by mouth daily. 30 tablet 11    fluticasone (FLONASE) 50 MCG/ACT nasal spray 2 sprays to each nostril twice daily x 1 week, then decrease 2 sprays once daily thereafter 16 g 3    mometasone-formoterol (DULERA) 200-5 MCG/ACT inhaler Inhale 2 puffs into the lungs 2 times daily 13 g 1    VITAMIN D, CHOLECALCIFEROL, PO Take by mouth daily      cefdinir (OMNICEF) 300 MG capsule Take 1 capsule (300 mg) by mouth 2 times daily (Patient not taking: Reported on 9/25/2024) 20 capsule 0    estradiol (ESTRACE) 0.1 MG/GM vaginal cream INSERT 1/2 APPLICATORFUL VAGINALLY 1 TO 2 TIMES A WEEK (Patient not taking: Reported on 9/25/2024)              Allergies:     Allergies: Patient has no known allergies.          Past Medical History:     History reviewed. No pertinent past medical history.         Past Surgical History:     Past Surgical History:   Procedure Laterality Date    COLONOSCOPY  7/26/2013    Procedure: COLONOSCOPY;  COLONOSCOPY WITH Polypectomy;  Surgeon: Sergei Andrade MD;  Location: [facility] OR    ENDOSCOPIC SINUS SURGERY N/A 6/23/2021    Procedure: Bilateral endoscopic sinus surgery;  Surgeon: Blanca Vaughn MD;  Location: [facility] OR    ENT SURGERY      TURBINOPLASTY Bilateral 6/23/2021    Procedure: Turbinate Reduction, Excision hyacinth bullosa;  Surgeon: Blanca Vaughn MD;  Location: [facility] OR       ENT  "family history reviewed         Social History:     Social History     Tobacco Use    Smoking status: Never    Smokeless tobacco: Never   Substance Use Topics    Alcohol use: Yes     Comment: social            Review of Systems:     ROS: See HPI         Physical Exam:     /85 (BP Location: Left arm, Patient Position: Sitting, Cuff Size: Adult Regular)   Pulse 81   Temp (!) 96.5  F (35.8  C) (Tympanic)   Resp 16   Ht 1.6 m (5' 3\")   Wt 74.8 kg (165 lb)   SpO2 96%   BMI 29.23 kg/m      General - The patient is well nourished and well developed, and appears to have good nutritional status.  Alert and oriented to person and place, answers questions and cooperates with examination appropriately.   Head and Face - Normocephalic and atraumatic, with no gross asymmetry noted.  The facial nerve is intact, with strong symmetric movements.  Voice and Breathing - The patient was breathing comfortably without the use of accessory muscles. There was no wheezing, stridor. The patients voice was clear and strong, and had appropriate pitch and quality.  Ears - External ear normal. Canals are patent. Right tympanic membrane is intact without effusion, retraction or mass. Left tympanic membrane is intact without effusion, retraction or mass.  Eyes - Extraocular movements intact, sclera were not icteric or injected.  Mouth - Examination of the oral cavity showed pink, healthy oral mucosa. Dentition in good condition. No lesions or ulcerations noted. The tongue was mobile and midline.  No masses or lesions noted on tongue with palpation and inspection.    Throat - The walls of the oropharynx were smooth, pink, moist, symmetric, and had no lesions or ulcerations.  The tonsillar pillars and soft palate were symmetric. The uvula was midline on elevation.    Neck - Normal midline excursion of the laryngotracheal complex during swallowing.  Normal ROM of neck with active movement.  Palpation of the occipital, submental, " submandibular, internal jugular chain, and supraclavicular nodes did not demonstrate any abnormal lymph nodes or masses.  Palpation of the thyroid was soft and smooth, with no nodules or goiter appreciated.  The trachea was mobile and midline.  Nose - External contour is symmetric, no gross deflection or scars.  Nasal mucosa is pink and moist with no abnormal mucus.  The septum and turbinates were evaluated with nasal speculum, no polyps, masses, or purulence noted on examination.    To evaluate the nose and sinuses, I performed rigid nasal endoscopy.  I sprayed both nares with 2 sprays lidocaine and neosynephrine.     I began with the RIGHT side using a 0 degree rigid nasal endoscope, and then similarly examined the LEFT side     Findings: Septum is grossly midline and intact  Inferior turbinates: Reduced and lateralized, however extremely pale and boggy  Antrostomy patent, however edematous bilaterally, right is worse than left.  Right antrostomy with minimal purulent drainage.  Right ethmoid cavity with polypoid change with small polyps, pale and boggy  Left ethmoid cavity is grossly flat, however there is mucopurulent drainage that was suction debrided and cultured  The superior meatus is examined and unremarkable  Mucosa is healthy throughout,  no polyps nor polypoid degeneration  Nasopharynx clear, ET patent, no edema  The patient tolerated the procedure well     Attempts at mirror laryngoscopy were not possible due to gag reflex.  Therefore I proceeded with a fiberoptic examination after informed consent.  First I sprayed both sides of the nose with a mixture of lidocaine and neosynephrine.  I then passed the scope through the nasal cavity.     The nasopharynx was mucosally covered and symmetric.  Copious amounts of clear drainage in the nasopharynx.  Significant cobblestoning noted on the posterior oropharyngeal wall.  The eustachian tube openings were unobstructed.  Going further down I had a clear view of  the base of tongue which had normal appearing lingual tonsillar tissue.  The base of tongue was free of lesions, and the vallecula was open.  The epiglottis was smooth and mucosally covered.  The supraglottic larynx was then clearly visualized.  The vocal cords moved smoothly and symmetrically and were pearly white.  The pyriform sinuses were open and without maeve mass or pooling of secretions upon valsalva, and the limited view of the postcricoid region did not show any lesions.  The patient tolerated the procedure well.           Assessment and Plan:       ICD-10-CM    1. Post-nasal drainage  R09.82 Inhalent Panel [place] (Serolab)     Total IgE (Serolab)     Inhalent Panel [place] (Serolab)     Total IgE (Serolab)     Respiratory Aerobic Bacterial Culture with Gram Stain     Fungus Culture, non-blood     Fungus Culture, non-blood     Respiratory Aerobic Bacterial Culture with Gram Stain      2. Chronic cough  R05.3 lidocaine 2%-oxymetazoline 0.025% nasal solution 2 spray     Inhalent Panel [place] (Serolab)     Total IgE (Serolab)     Inhalent Panel [place] (Serolab)     Total IgE (Serolab)      3. Acute maxillary sinusitis, recurrence not specified  J01.00 cefdinir (OMNICEF) 300 MG capsule     cetirizine (ZYRTEC) 10 MG tablet      4. Nasal polyp  J33.9 budesonide (PULMICORT) 0.5 MG/2ML neb solution      5. Seasonal allergic rhinitis, unspecified trigger  J30.2 cetirizine (ZYRTEC) 10 MG tablet        We will call you with results of allergy testing   Start cefdinir - this is an antibiotic   Start zyrtec - this is an antihistamine  Start budesonide rinses  Budesonide rinses:  Budesonide nasal saline irrigation per instructions:  -Obtain [facility] Sinus rinse over the counter.    -Use warm distilled water and 2 packets of the salt solution that comes with the bottle, dissolve in bottle up to the 240 mL gabriella.  -Add 1 vial of budesonide.  -Irrigate each side of your nose leaning over the sink, using 1/3 to  1/2 the volume of the bottle in each nostril every irrigation.  Irrigate 2 times daily.  -If additional rinses are needed/recommended, you may use the plan [facility] Sinus irrigation without the use of added budesonide.     Follow up based on results of allergy testing and symptoms    Nelly BAXTER  [facility]NT

## 2024-09-25 NOTE — PATIENT INSTRUCTIONS
We will call you with results of allergy testing   Start cefdinir - this is an antibiotic   Start zyrtec - this is an antihistamine  Start budesonide rinses  Budesonide rinses:  Budesonide nasal saline irrigation per instructions:  -Obtain [facility] Sinus rinse over the counter.    -Use warm distilled water and 2 packets of the salt solution that comes with the bottle, dissolve in bottle up to the 240 mL gabriella.  -Add 1 vial of budesonide.  -Irrigate each side of your nose leaning over the sink, using 1/3 to 1/2 the volume of the bottle in each nostril every irrigation.  Irrigate 2 times daily.  -If additional rinses are needed/recommended, you may use the plan [facility] Sinus irrigation without the use of added budesonide.     Follow up based on results of allergy testing and symptoms      Thank you for allowing Nelly BAXTER and our ENT team to participate in your care.  If your medications are too expensive, please call my nurse at the number listed below.  We can possibly change this medication.    If you have a scheduling or an appointment question please contact our Health Unit Coordinator at their direct line [phone] ext [phone]  ALL nursing questions or concerns can be directed to my Nurse Carolyn [phone].

## 2024-09-25 NOTE — LETTER
9/25/2024      Rita Stovall  [id number] [address] [facility]  [place] [address]      Dear Colleague,    Thank you for referring your patient, Rita Stovall, to the [facility]. Please see a copy of my visit note below.    Otolaryngology Note         Chief Complaint:     Patient presents with:  Cough: Referred by Dr. Garcia from [facility]   Asthma  Allergies: Test for allergies            History of Present Illness:     Rita Stovall is a 64 year old female seen today for concerns for cough.      She reports clear drainage that she can feel draining down the throat.  This causes cough.  Seems to be worse in fall months.    She reports something similar happened this time of year last year.     She saw her Dr Garcia, pulmonologist and requested allergy testing  She uses rinses and nasal sprays with some improvement, but is questionable how much  She has dulera inhaler that she only uses as needed.  Used it more with humidity and heat.      She has had some recent wheezing in her chest, also has nasal congestion and it makes noise with breathing at times.    History of sinus surgery x 2, last was June 2021:  Otolaryngology Operative Note      Pre-op Diagnosis: Chronic pansinusitis, bilateral hyacinth bullosa, bilateral inferior turbinate hypertrophy  Post-op Diagnosis:  same  Procedures:    1.  Bilateral total ethmoidectomy with polypectomy  2.  Bilateral max antrostomy with tissue removal  3.  Right sphenoidotomy  4.  Bilateral excision hyacinth bullosa  5.  Bilateral submucosal reduction inferior turbinates     Sinus procedures performed with [employer] navigation  Surgeon:  Blanca Vaughn D.O.  Anesthesia:  General endotracheal  EBL:  15 ml  Findings: small polyps and polypoid degeneration of the anterior and posterior ethmoid cavity.  Complications:  none  Condition:  stable     She has intermittent facial pain and pressure, recently started taking OTC sinus pills with improvement  She does not  currently take claritin or zyrtec.      No recent fevers or chills, appetite and energy level have been good.    Only feeling shortness of breath with high humidity    She works at [employer] as   no hemoptysis  No pharyngitis  No dysphagia  No weight loss  No otalgia         Medications:     Current Outpatient Rx   Medication Sig Dispense Refill     budesonide (PULMICORT) 0.5 MG/2ML neb solution Mix 240 ml [facility] Sinus rinse, add 0.5 mg budesonide vial, rinse 1/2 bottle in each nostril twice daily 120 mL 4     budesonide (PULMICORT) 0.5 MG/2ML neb solution Make 240 cc [facility] sinus irrigation Mix 2 ml vial of budesonide 0.5 mg Rinse BID for 2 weeks 120 mL 1     cefdinir (OMNICEF) 300 MG capsule Take 1 capsule (300 mg) by mouth 2 times daily for 7 days. 14 capsule 0     cetirizine (ZYRTEC) 10 MG tablet Take 1 tablet (10 mg) by mouth daily. 30 tablet 11     fluticasone (FLONASE) 50 MCG/ACT nasal spray 2 sprays to each nostril twice daily x 1 week, then decrease 2 sprays once daily thereafter 16 g 3     mometasone-formoterol (DULERA) 200-5 MCG/ACT inhaler Inhale 2 puffs into the lungs 2 times daily 13 g 1     VITAMIN D, CHOLECALCIFEROL, PO Take by mouth daily       cefdinir (OMNICEF) 300 MG capsule Take 1 capsule (300 mg) by mouth 2 times daily (Patient not taking: Reported on 9/25/2024) 20 capsule 0     estradiol (ESTRACE) 0.1 MG/GM vaginal cream INSERT 1/2 APPLICATORFUL VAGINALLY 1 TO 2 TIMES A WEEK (Patient not taking: Reported on 9/25/2024)              Allergies:     Allergies: Patient has no known allergies.          Past Medical History:     History reviewed. No pertinent past medical history.         Past Surgical History:     Past Surgical History:   Procedure Laterality Date     COLONOSCOPY  7/26/2013    Procedure: COLONOSCOPY;  COLONOSCOPY WITH Polypectomy;  Surgeon: Sergei Andrade MD;  Location: [facility] OR     ENDOSCOPIC SINUS SURGERY N/A 6/23/2021    Procedure: Bilateral endoscopic  "sinus surgery;  Surgeon: Blanca Vaughn MD;  Location: [facility] OR     ENT SURGERY       TURBINOPLASTY Bilateral 6/23/2021    Procedure: Turbinate Reduction, Excision hyacinth bullosa;  Surgeon: Blanca Vaughn MD;  Location: [facility] OR       ENT family history reviewed         Social History:     Social History     Tobacco Use     Smoking status: Never     Smokeless tobacco: Never   Substance Use Topics     Alcohol use: Yes     Comment: social            Review of Systems:     ROS: See HPI         Physical Exam:     /85 (BP Location: Left arm, Patient Position: Sitting, Cuff Size: Adult Regular)   Pulse 81   Temp (!) 96.5  F (35.8  C) (Tympanic)   Resp 16   Ht 1.6 m (5' 3\")   Wt 74.8 kg (165 lb)   SpO2 96%   BMI 29.23 kg/m      General - The patient is well nourished and well developed, and appears to have good nutritional status.  Alert and oriented to person and place, answers questions and cooperates with examination appropriately.   Head and Face - Normocephalic and atraumatic, with no gross asymmetry noted.  The facial nerve is intact, with strong symmetric movements.  Voice and Breathing - The patient was breathing comfortably without the use of accessory muscles. There was no wheezing, stridor. The patients voice was clear and strong, and had appropriate pitch and quality.  Ears - External ear normal. Canals are patent. Right tympanic membrane is intact without effusion, retraction or mass. Left tympanic membrane is intact without effusion, retraction or mass.  Eyes - Extraocular movements intact, sclera were not icteric or injected.  Mouth - Examination of the oral cavity showed pink, healthy oral mucosa. Dentition in good condition. No lesions or ulcerations noted. The tongue was mobile and midline.  No masses or lesions noted on tongue with palpation and inspection.    Throat - The walls of the oropharynx were smooth, pink, moist, symmetric, and had no lesions or ulcerations.  The tonsillar " pillars and soft palate were symmetric. The uvula was midline on elevation.    Neck - Normal midline excursion of the laryngotracheal complex during swallowing.  Normal ROM of neck with active movement.  Palpation of the occipital, submental, submandibular, internal jugular chain, and supraclavicular nodes did not demonstrate any abnormal lymph nodes or masses.  Palpation of the thyroid was soft and smooth, with no nodules or goiter appreciated.  The trachea was mobile and midline.  Nose - External contour is symmetric, no gross deflection or scars.  Nasal mucosa is pink and moist with no abnormal mucus.  The septum and turbinates were evaluated with nasal speculum, no polyps, masses, or purulence noted on examination.    To evaluate the nose and sinuses, I performed rigid nasal endoscopy.  I sprayed both nares with 2 sprays lidocaine and neosynephrine.     I began with the RIGHT side using a 0 degree rigid nasal endoscope, and then similarly examined the LEFT side     Findings: Septum is grossly midline and intact  Inferior turbinates: Reduced and lateralized, however extremely pale and boggy  Antrostomy patent, however edematous bilaterally, right is worse than left.  Right antrostomy with minimal purulent drainage.  Right ethmoid cavity with polypoid change with small polyps, pale and boggy  Left ethmoid cavity is grossly flat, however there is mucopurulent drainage that was suction debrided and cultured  The superior meatus is examined and unremarkable  Mucosa is healthy throughout,  no polyps nor polypoid degeneration  Nasopharynx clear, ET patent, no edema  The patient tolerated the procedure well     Attempts at mirror laryngoscopy were not possible due to gag reflex.  Therefore I proceeded with a fiberoptic examination after informed consent.  First I sprayed both sides of the nose with a mixture of lidocaine and neosynephrine.  I then passed the scope through the nasal cavity.     The nasopharynx was  mucosally covered and symmetric.  Copious amounts of clear drainage in the nasopharynx.  Significant cobblestoning noted on the posterior oropharyngeal wall.  The eustachian tube openings were unobstructed.  Going further down I had a clear view of the base of tongue which had normal appearing lingual tonsillar tissue.  The base of tongue was free of lesions, and the vallecula was open.  The epiglottis was smooth and mucosally covered.  The supraglottic larynx was then clearly visualized.  The vocal cords moved smoothly and symmetrically and were pearly white.  The pyriform sinuses were open and without maeve mass or pooling of secretions upon valsalva, and the limited view of the postcricoid region did not show any lesions.  The patient tolerated the procedure well.           Assessment and Plan:       ICD-10-CM    1. Post-nasal drainage  R09.82 Inhalent Panel [place] (Serolab)     Total IgE (Serolab)     Inhalent Panel [place] (Serolab)     Total IgE (Serolab)     Respiratory Aerobic Bacterial Culture with Gram Stain     Fungus Culture, non-blood     Fungus Culture, non-blood     Respiratory Aerobic Bacterial Culture with Gram Stain      2. Chronic cough  R05.3 lidocaine 2%-oxymetazoline 0.025% nasal solution 2 spray     Inhalent Panel [place] (Serolab)     Total IgE (Serolab)     Inhalent Panel [place] (Serolab)     Total IgE (Serolab)      3. Acute maxillary sinusitis, recurrence not specified  J01.00 cefdinir (OMNICEF) 300 MG capsule     cetirizine (ZYRTEC) 10 MG tablet      4. Nasal polyp  J33.9 budesonide (PULMICORT) 0.5 MG/2ML neb solution      5. Seasonal allergic rhinitis, unspecified trigger  J30.2 cetirizine (ZYRTEC) 10 MG tablet        We will call you with results of allergy testing   Start cefdinir - this is an antibiotic   Start zyrtec - this is an antihistamine  Start budesonide rinses  Budesonide rinses:  Budesonide nasal saline irrigation per instructions:  -Obtain [facility] Sinus rinse  over the counter.    -Use warm distilled water and 2 packets of the salt solution that comes with the bottle, dissolve in bottle up to the 240 mL gabriella.  -Add 1 vial of budesonide.  -Irrigate each side of your nose leaning over the sink, using 1/3 to 1/2 the volume of the bottle in each nostril every irrigation.  Irrigate 2 times daily.  -If additional rinses are needed/recommended, you may use the plan [facility] Sinus irrigation without the use of added budesonide.     Follow up based on results of allergy testing and symptoms    Nelly BAXTER  [facility]    Again, thank you for allowing me to participate in the care of your patient.        Sincerely,        Nelly Saavedra NP

## 2024-09-28 LAB
BACTERIA SPEC CULT: ABNORMAL
GRAM STAIN RESULT: ABNORMAL

## 2024-10-01 ENCOUNTER — TELEPHONE (OUTPATIENT)
Dept: OTOLARYNGOLOGY | Facility: OTHER | Age: 64
End: 2024-10-01

## 2024-10-01 NOTE — TELEPHONE ENCOUNTER
Voicemail left for patient to call back ENT dept., call back number left.   (Regarding per Nelly Saavedra, nasal culture results reviewed:  Normal lalito.  Finish abx).

## 2024-10-24 LAB — BACTERIA SPEC CULT: NO GROWTH

## 2024-10-28 LAB
SCANNED LAB RESULT: NORMAL
SCANNED LAB RESULT: NORMAL

## 2024-11-04 ENCOUNTER — TELEPHONE (OUTPATIENT)
Dept: OTOLARYNGOLOGY | Facility: OTHER | Age: 64
End: 2024-11-04

## 2024-11-04 NOTE — TELEPHONE ENCOUNTER
Rita called and would like a call with her lab blood draw results . She said no one ever called her.

## (undated) DEVICE — BIN-ENT BIN

## (undated) DEVICE — IRRIGATION-H2O 1000ML

## (undated) DEVICE — PACK-ENT-CUSTOM

## (undated) DEVICE — NASOPORE 4CM FIRM

## (undated) DEVICE — TUBING-IRRIGATOR STRAIGHTSHOT FUSION

## (undated) DEVICE — IRRIGATION-NACL 1000ML

## (undated) DEVICE — LABEL-STERILE PREPRINTED FOR OR

## (undated) DEVICE — BLADE-SCALPEL #15

## (undated) DEVICE — TRACKER-PATIENT ENT NIPT

## (undated) DEVICE — NDL-REINFORCED ANESTHESIA 27G X 3.5"

## (undated) DEVICE — PACK-BASIN SET-UP

## (undated) DEVICE — GLV-6.5 PROTEXIS PI CLASSIC LF/PF

## (undated) DEVICE — CAUTERY PENCIL-W/SUCTION 8FR HANDSWITCHING

## (undated) DEVICE — TUBING-IRRIG. SYSTEM CLEARVISION

## (undated) DEVICE — SCD SLEEVE-KNEE REG.

## (undated) DEVICE — BLANKET-BAIR LOWER EXTREMITY

## (undated) DEVICE — CAUTERY PAD-POLYHESIVE II ADULT

## (undated) DEVICE — GOWN-SURG XL LVL 3 REINFORCED

## (undated) DEVICE — TRACKER-INSTRUMENT ENT NAV

## (undated) DEVICE — ADAPTER-SPECIMEN TRAP

## (undated) DEVICE — CANISTER-SUCTION 2000CC

## (undated) DEVICE — Device

## (undated) DEVICE — SOL-NACL 0.9% 1000ML

## (undated) DEVICE — NDL-27G X 1 1/4" NON-SAFETY

## (undated) DEVICE — LIGHT HANDLE COVER

## (undated) RX ORDER — PROPOFOL 10 MG/ML
INJECTION, EMULSION INTRAVENOUS
Status: DISPENSED
Start: 2021-06-23

## (undated) RX ORDER — DEXAMETHASONE SODIUM PHOSPHATE 10 MG/ML
INJECTION, SOLUTION INTRAMUSCULAR; INTRAVENOUS
Status: DISPENSED
Start: 2021-06-23

## (undated) RX ORDER — LIDOCAINE HYDROCHLORIDE 20 MG/ML
INJECTION, SOLUTION EPIDURAL; INFILTRATION; INTRACAUDAL; PERINEURAL
Status: DISPENSED
Start: 2021-06-23

## (undated) RX ORDER — ONDANSETRON 2 MG/ML
INJECTION INTRAMUSCULAR; INTRAVENOUS
Status: DISPENSED
Start: 2021-06-23

## (undated) RX ORDER — KETAMINE HCL IN NACL, ISO-OSM 100MG/10ML
SYRINGE (ML) INJECTION
Status: DISPENSED
Start: 2021-06-23

## (undated) RX ORDER — EPHEDRINE SULFATE 50 MG/ML
INJECTION, SOLUTION INTRAVENOUS
Status: DISPENSED
Start: 2021-06-23